# Patient Record
Sex: MALE | Race: WHITE | Employment: OTHER | URBAN - METROPOLITAN AREA
[De-identification: names, ages, dates, MRNs, and addresses within clinical notes are randomized per-mention and may not be internally consistent; named-entity substitution may affect disease eponyms.]

---

## 2017-08-22 ENCOUNTER — PRE VISIT (OUTPATIENT)
Dept: UROLOGY | Facility: CLINIC | Age: 77
End: 2017-08-22

## 2017-08-22 NOTE — TELEPHONE ENCOUNTER
"PREVISIT INFORMATION                                                    Андрей Lawson scheduled for future visit at [facility] specialty clinics.    Patient is scheduled to see Dr. Warner on 8/23/17 at 1:30pm  Reason for visit: hydrocele, 2nd opinion  Referring provider: self-referred  Has patient seen previous specialist? Yes.  Name of provider Dr. Silvio Carrera, Clinic/Facility [facility].   Medical Records:  patient stated, \"I filled out a form so you should be able to get the records from [facility].\"    REVIEW                                                      New patient packet mailed to patient: No  Medication reconciliation complete: No      No current outpatient prescriptions on file.       Allergies: Review of patient's allergies indicates not on file.    PLAN/FOLLOW-UP NEEDED                                                      The following is needed before upcoming appointment: Records from [facility] needed. Patient stated, \"I felt that I needed another opinion, so I have no referral. I want to make sure it is covered to be seen there though.\" Encouraged patient to contact his insurance company and possibly the [facility] to determine coverage. Informed patient that he could cancel or postpone the appointment with Dr. Warner if needed. Patient verbalized understanding and will discuss with his daughter jennifer.    Diane Andino RN, BSN  -[facility]  Urology/General Surgery      "

## 2017-08-23 ENCOUNTER — OFFICE VISIT (OUTPATIENT)
Dept: UROLOGY | Facility: CLINIC | Age: 77
End: 2017-08-23
Payer: COMMERCIAL

## 2017-08-23 ENCOUNTER — TELEPHONE (OUTPATIENT)
Dept: UROLOGY | Facility: CLINIC | Age: 77
End: 2017-08-23

## 2017-08-23 VITALS
HEIGHT: 70 IN | SYSTOLIC BLOOD PRESSURE: 131 MMHG | WEIGHT: 220 LBS | DIASTOLIC BLOOD PRESSURE: 78 MMHG | BODY MASS INDEX: 31.5 KG/M2 | HEART RATE: 58 BPM

## 2017-08-23 DIAGNOSIS — N43.2 OTHER HYDROCELE: Primary | ICD-10-CM

## 2017-08-23 PROCEDURE — 99203 OFFICE O/P NEW LOW 30 MIN: CPT | Performed by: UROLOGY

## 2017-08-23 RX ORDER — CHLORHEXIDINE GLUCONATE 40 MG/ML
SOLUTION TOPICAL ONCE
Status: CANCELLED | OUTPATIENT
Start: 2017-08-23 | End: 2017-08-23

## 2017-08-23 RX ORDER — CARBIDOPA AND LEVODOPA 25; 100 MG/1; MG/1
2 TABLET ORAL
Status: ON HOLD | COMMUNITY
End: 2020-11-23

## 2017-08-23 RX ORDER — CARVEDILOL 25 MG/1
25 TABLET ORAL
Status: ON HOLD | COMMUNITY
End: 2020-11-23

## 2017-08-23 ASSESSMENT — PAIN SCALES - GENERAL: PAINLEVEL: NO PAIN (0)

## 2017-08-23 NOTE — NURSING NOTE
Андрей Lawson's goals for this visit include:   Chief Complaint   Patient presents with     Consult     second opinion - hydrocele      He requests these members of his care team be copied on today's visit information: None    Initial /78 (BP Location: Left arm, Patient Position: Chair, Cuff Size: Adult Large)  Pulse 58  Wt 99.8 kg (220 lb) There is no height or weight on file to calculate BMI.  BP completed using cuff size: large

## 2017-08-23 NOTE — PATIENT INSTRUCTIONS
Surgery Instructions    Always follow your surgeon s instructions. If you don t, your surgery could be cancelled. Please use the following checklist.  Your surgery is on: The surgery scheduler will contact you within 1 week of your consult with the surgeon. If you do not hear from them, please call the clinic or RN Care Coordinator for your provider.    Time: Prearrival times can vary depending on location/type of surgery.    [place] - 1 hour pre-arrival    Note:  These times may change. A nurse will call you before surgery to confirm. If you have not received a call or if you have more questions, please call us on the working day before your surgery:  ? [place]: [phone] or [phone] (9am to 5:30pm)  Prior to surgery  ? Have a pre-op physical exam with your Primary Doctor within 30 days of surgery  - Ask your doctor to send all of your results to the surgery center/hospital before surgery. Your doctor also may ask you to bring the results with you on the day of surgery.  - Tell your doctor if:  - You are allergic to latex or rubber (latex and rubber gloves are often used in medical care).  - You are taking any medicines (including aspirin), vitamins, or herbal products. You may need to stop taking some medicines before surgery.  - You have any medical problems (allergies, diabetes, or heart disease, for example).  - You have a pacemaker or an AICD (automatic implanted cardiac defibrillator). If you do, please bring the ID card with you on the day of surgery.  - You are a smoker. People who smoke have a higher risk of infection after surgery. Ask your doctor how you can quit smoking.  - If you Primary Doctor is not within the [facility] system, you will need to have your pre-op physical faxed to us to be scanned into your chart.  - [place]: [phone] or [phone]  ? Call your insurance company. Ask if you need pre-approval for your surgery. If you do not have insurance, please let us know. If  you wish to speak to the , please alert the clinic staff so this can be arranged.  ? Arrange for someone to drive you home after surgery.  will need to be a responsible adult (18 years or older) that will provide transportation to and from surgery and stay in the waiting room during your surgery. You may not drive yourself or take public transportation to and from surgery.  ? Arrange for someone to stay with you for 24 hours after you go home. This person must be a responsible adult (18 years or older).  ? Call your surgeon or their nurse if there is any change in your health (cold, flu, infections, hospitalizations).  ? Do not smoke, drink alcohol, or take over-the-counter medicine for 24 hours before and after surgery.  ? If you take prescribed drugs, you may need to stop them until after the surgery.  Discuss what medications to take or not take prior to surgery with your Primary Doctor at your pre-op physical. Avoid over-the-counter blood-thinning medications such as Aspirin, Ibuprofen, vitamin E, or fish oil 7 days prior to surgery (unless otherwise directed by your Primary Doctor). Tylenol is a good alternative for mild pain relief prior to surgery.  ? Eating and drinking guidelines prior to surgery:  - Stop all solid food consumption 8 hours prior to surgery  - You may drink clear liquids up to 2 hours prior to surgery (water, fruit juices without pulp, jello, tea/coffee without creamer, sports drinks, clear-fat free broth (bouillon or consomme), popsicles (without milk, bits of fruit, or seeds/nuts)  ? Follow instructions given for showering or bathing before surgery.    - Use 8 ounces of antiseptic surgical soap, like:  - Hibiclens, Scrub Care, or Exidine  - You can find it at your local pharmacy, clinic, or retail store. If you have trouble, ask your pharmacist to help you find the right substitute.  - Please wash with one of the above soaps twice before coming to the hospital for  your surgery. This will decrease bacteria (germs) on your skin. It will also help reduce your chance of infection after surgery.  - Items you will need for showering:  - 4 newly washed washcloths  - 2 newly washed towels  - 8 ounces of one of the above soaps  - Following these instructions:  - The evening before surgery: Shower or bathe as you normally would, using your regular soap and a clean washcloth. Give special attention to places where your incision (surgical cut) or catheters will be. This includes your groin area. Rinse well. You may wash your hair with your regular shampoo. Next, wash your body with 4 ounces of the antiseptic soap. Use a clean, damp washcloth and gently clean your body (from the chin down). If your surgery involves your head, use the special soap on your head and scalp. Rinse well and dry off using a newly washed towel.  - The morning of surgery: Repeat the same process as the evening shower.  - Other suggestions:    Do not shave within 12 inches of your incision (surgical cut) area for at least 3 days before surgery. Shaving can make small cuts in the skin. This puts you at higher risk of infection.    Wear freshly washed pajamas or clothing after your evening shower.    Wear freshly washed clothes the day of surgery.    Wash and change your bed sheets the day before surgery to have clean bed sheets after your shower and when you get home from surgery.    If you have trouble washing all areas, make sure someone helps you.    Don't use any deodorant, lotion or powder after your shower.    Women who are menstruating should wear a fresh sanitary pad to the hospital.  ? Do not wear or add deodorant, cologne, lotion, makeup, nail polish or jewelry to surgery. If you wear fake nails, please remove at least one nail before coming to surgery (an oxygen monitor needs to be placed on your finger during surgery).  ? Bring these items to the surgery center/hospital:  - Insurance card  - Money for  parking and co-pays, if needed  - A list of all the medicines you take. Include vitamins, minerals, herbs, and over-the-counter drugs.  Note any drug allergies.  - A copy of your advance health care directive, if you have one. This tells us what treatment you would want--and who would make health care decisions--if you could no longer speak for yourself. You may request this form in advance or download it from [email or web address].  - A case for glasses, contact lenses, hearing aids, or dentures.  - Your inhaler or CPAP machine, if you use these at home.  ? Leave extra cash, jewelry, and other valuables at home.  When you arrive  When you get to the surgery center/hospital, you will:  ? Check in. If you are under age 18, you must be with a parent or legal guardian.  ? Sign consent forms, if you haven t already. These forms state that you know the risks and benefits of surgery. When you sign the forms, you give us permission to do the surgery. Do not sign them unless you understand what will happen during and after your surgery. If you have any questions about your surgery, ask to speak with your doctor before you sign the forms. If you don t understand the answers, ask again.  ? Receive a copy of the Patient s Bill of Rights. If you do not receive a copy, please ask for one.  ? Change into hospital clothes. Your belongings will be placed in a bag. We will return them to you after surgery.  ? Meet with the anesthesia provider. He or she will tell you what kind of anesthesia (medicine) will be used to keep you comfortable during surgery.  Remember: it s okay to remind doctors and nurses to wash their hands before touching you.  In most cases, your surgeon will use a marker to write his or her initials on the surgery site. This ensures that the exact site is operated on.  For safety reasons, we will ask you the same questions many times. For example, we may ask your name and birth date over and over again.  Friends  and family can stay with you until it s time for surgery. While you re in surgery, they will be in the waiting area. Please note that cell phones are not allowed in some patient care areas.  If you have questions about what will happen in the operating room, talk to your care team.  After surgery  We will move you to a recovery room, where we will watch you closely. If you have any pain or discomfort, tell your nurse. He or she will try to make you comfortable.  If you are staying overnight, we will move you to your hospital room after you are awake.  If you are going home, we will move you to another room. Friends and family may be able to join you. The length of time you spend in recovery depend on the type of medicine you received, your medical condition, the type of surgery you had, or your response to the anesthesia given during your procedure.  When you are discharged from the recovery room, the nurses will review instructions with you and your caregiver.  ? Please wash your hands every time you touch the wound or change bandages or dressings.  ? Do not submerge the wound in water.  You may not use a bathtub or hot tub until the wound is closed. The wait time frame is generally 2-3 weeks, but any open area can be a source of incoming bacteria, so it is better to be on the safe side and avoid water submersion until your wound is fully healed.  ? You may take a shower 24 hours after surgery. Double check with your surgeon if it is OK for water to run over the wound, whether it has been sutured, stapled, glued, or is open. You may gently wash the wound using the antiseptic soap provided for your pre-surgery showering (do not use a washcloth). Any mild soap will work as well.  ? Many surgical wounds will have small white strips of tape on them called steri-strips.  Do not remove these. The edges will curl and fall off within 7-10 days with normal showering.  ? If you are going home with sutures (stitches) or  staples, you must return to the clinic to have them taken out, usually within 1-2 weeks. Some stitches are dissolvable and do not require removal. Make sure to clarify with your surgeon or surgery nurse reviewing discharge paperwork what kind of sutures you have.  ? Signs and symptoms of infection include:  - Fever, temperature over 101.5   F  - Redness  - Swelling  - Increased pain  - Green or yellow drainage which may or may not have a foul odor  Dealing with pain  A nurse will check your comfort level often during your stay. He or she will work with you to manage your pain.  Remember:  ? All pain is real. There are many ways to control pain. We can help you decide what works best for you.  ? Ask for pain medicine when you need it. Don t try to  tough it out --this can make you feel worse. Always take your medicine as ordered.  ? Medicine doesn t work the same for everyone. If your medicine isn t working, tell your nurse. There may be other medicines or treatments we can try.  Going home  We will let you know when you re ready to leave the surgery center or hospital. Before you leave, we will tell you how to care for yourself at home and prevent infections. If you do not understand something, please say so. We will answer any questions you have. We will then help you get ready to leave.  Remember, you must have a responsible adult (18 years or older) to stay with you 24 hours after you leave the hospital.  Take it easy when you get home. You will need some time to recover--you may be more tired than you realize at first. Rest and relax for at least the first 24 hours at home. You ll feel better and heal faster if you take good care of yourself.  Follow the discharge instructions that are given to you when you leave the surgery center or hospital  Please call the clinic if you experience any problems during regular clinic hours (Monday-Friday 8:00am-5:00pm).  If you experience problems during non-clinic hours,  please call the [facility] on-call line at [phone] and ask the  to page the on-call Provider for your specialty. The on-call Provider will call you back and can triage your symptoms and further advise. If you are having an emergency, always call 911 or seek immediate evaluation at the Emergency Room.  Locations  [facility]  Same-day surgery center - 2nd floor, check-in #5  [address] [place] [address]  [phone]  [email or web address]

## 2017-08-23 NOTE — MR AVS SNAPSHOT
After Visit Summary   8/23/2017    Андрей Lawson    MRN: [id number]           Patient Information     Date Of Birth          1940        Visit Information        Provider Department      8/23/2017 1:30 PM Bola Warner MD [facility]        Today's Diagnoses     Other hydrocele    -  1      Care Instructions    Surgery Instructions    Always follow your surgeon s instructions. If you don t, your surgery could be cancelled. Please use the following checklist.  Your surgery is on: The surgery scheduler will contact you within 1 week of your consult with the surgeon. If you do not hear from them, please call the clinic or RN Care Coordinator for your provider.    Time: Prearrival times can vary depending on location/type of surgery.    [place] - 1 hour pre-arrival    Note:  These times may change. A nurse will call you before surgery to confirm. If you have not received a call or if you have more questions, please call us on the working day before your surgery:  ? [place]: [phone] or [phone] (9am to 5:30pm)  Prior to surgery  ? Have a pre-op physical exam with your Primary Doctor within 30 days of surgery  - Ask your doctor to send all of your results to the surgery center/hospital before surgery. Your doctor also may ask you to bring the results with you on the day of surgery.  - Tell your doctor if:  - You are allergic to latex or rubber (latex and rubber gloves are often used in medical care).  - You are taking any medicines (including aspirin), vitamins, or herbal products. You may need to stop taking some medicines before surgery.  - You have any medical problems (allergies, diabetes, or heart disease, for example).  - You have a pacemaker or an AICD (automatic implanted cardiac defibrillator). If you do, please bring the ID card with you on the day of surgery.  - You are a smoker. People who smoke have a higher risk of infection after surgery. Ask  your doctor how you can quit smoking.  - If you Primary Doctor is not within the [facility] system, you will need to have your pre-op physical faxed to us to be scanned into your chart.  - [place]: [phone] or [phone]  ? Call your insurance company. Ask if you need pre-approval for your surgery. If you do not have insurance, please let us know. If you wish to speak to the , please alert the clinic staff so this can be arranged.  ? Arrange for someone to drive you home after surgery.  will need to be a responsible adult (18 years or older) that will provide transportation to and from surgery and stay in the waiting room during your surgery. You may not drive yourself or take public transportation to and from surgery.  ? Arrange for someone to stay with you for 24 hours after you go home. This person must be a responsible adult (18 years or older).  ? Call your surgeon or their nurse if there is any change in your health (cold, flu, infections, hospitalizations).  ? Do not smoke, drink alcohol, or take over-the-counter medicine for 24 hours before and after surgery.  ? If you take prescribed drugs, you may need to stop them until after the surgery.  Discuss what medications to take or not take prior to surgery with your Primary Doctor at your pre-op physical. Avoid over-the-counter blood-thinning medications such as Aspirin, Ibuprofen, vitamin E, or fish oil 7 days prior to surgery (unless otherwise directed by your Primary Doctor). Tylenol is a good alternative for mild pain relief prior to surgery.  ? Eating and drinking guidelines prior to surgery:  - Stop all solid food consumption 8 hours prior to surgery  - You may drink clear liquids up to 2 hours prior to surgery (water, fruit juices without pulp, jello, tea/coffee without creamer, sports drinks, clear-fat free broth (bouillon or consomme), popsicles (without milk, bits of fruit, or seeds/nuts)  ? Follow instructions given for  showering or bathing before surgery.    - Use 8 ounces of antiseptic surgical soap, like:  - Hibiclens, Scrub Care, or Exidine  - You can find it at your local pharmacy, clinic, or retail store. If you have trouble, ask your pharmacist to help you find the right substitute.  - Please wash with one of the above soaps twice before coming to the hospital for your surgery. This will decrease bacteria (germs) on your skin. It will also help reduce your chance of infection after surgery.  - Items you will need for showering:  - 4 newly washed washcloths  - 2 newly washed towels  - 8 ounces of one of the above soaps  - Following these instructions:  - The evening before surgery: Shower or bathe as you normally would, using your regular soap and a clean washcloth. Give special attention to places where your incision (surgical cut) or catheters will be. This includes your groin area. Rinse well. You may wash your hair with your regular shampoo. Next, wash your body with 4 ounces of the antiseptic soap. Use a clean, damp washcloth and gently clean your body (from the chin down). If your surgery involves your head, use the special soap on your head and scalp. Rinse well and dry off using a newly washed towel.  - The morning of surgery: Repeat the same process as the evening shower.  - Other suggestions:    Do not shave within 12 inches of your incision (surgical cut) area for at least 3 days before surgery. Shaving can make small cuts in the skin. This puts you at higher risk of infection.    Wear freshly washed pajamas or clothing after your evening shower.    Wear freshly washed clothes the day of surgery.    Wash and change your bed sheets the day before surgery to have clean bed sheets after your shower and when you get home from surgery.    If you have trouble washing all areas, make sure someone helps you.    Don't use any deodorant, lotion or powder after your shower.    Women who are menstruating should wear a fresh  sanitary pad to the hospital.  ? Do not wear or add deodorant, cologne, lotion, makeup, nail polish or jewelry to surgery. If you wear fake nails, please remove at least one nail before coming to surgery (an oxygen monitor needs to be placed on your finger during surgery).  ? Bring these items to the surgery center/hospital:  - Insurance card  - Money for parking and co-pays, if needed  - A list of all the medicines you take. Include vitamins, minerals, herbs, and over-the-counter drugs.  Note any drug allergies.  - A copy of your advance health care directive, if you have one. This tells us what treatment you would want--and who would make health care decisions--if you could no longer speak for yourself. You may request this form in advance or download it from [email or web address].  - A case for glasses, contact lenses, hearing aids, or dentures.  - Your inhaler or CPAP machine, if you use these at home.  ? Leave extra cash, jewelry, and other valuables at home.  When you arrive  When you get to the surgery center/hospital, you will:  ? Check in. If you are under age 18, you must be with a parent or legal guardian.  ? Sign consent forms, if you haven t already. These forms state that you know the risks and benefits of surgery. When you sign the forms, you give us permission to do the surgery. Do not sign them unless you understand what will happen during and after your surgery. If you have any questions about your surgery, ask to speak with your doctor before you sign the forms. If you don t understand the answers, ask again.  ? Receive a copy of the Patient s Bill of Rights. If you do not receive a copy, please ask for one.  ? Change into hospital clothes. Your belongings will be placed in a bag. We will return them to you after surgery.  ? Meet with the anesthesia provider. He or she will tell you what kind of anesthesia (medicine) will be used to keep you comfortable during surgery.  Remember: it s okay to  remind doctors and nurses to wash their hands before touching you.  In most cases, your surgeon will use a marker to write his or her initials on the surgery site. This ensures that the exact site is operated on.  For safety reasons, we will ask you the same questions many times. For example, we may ask your name and birth date over and over again.  Friends and family can stay with you until it s time for surgery. While you re in surgery, they will be in the waiting area. Please note that cell phones are not allowed in some patient care areas.  If you have questions about what will happen in the operating room, talk to your care team.  After surgery  We will move you to a recovery room, where we will watch you closely. If you have any pain or discomfort, tell your nurse. He or she will try to make you comfortable.  If you are staying overnight, we will move you to your hospital room after you are awake.  If you are going home, we will move you to another room. Friends and family may be able to join you. The length of time you spend in recovery depend on the type of medicine you received, your medical condition, the type of surgery you had, or your response to the anesthesia given during your procedure.  When you are discharged from the recovery room, the nurses will review instructions with you and your caregiver.  ? Please wash your hands every time you touch the wound or change bandages or dressings.  ? Do not submerge the wound in water.  You may not use a bathtub or hot tub until the wound is closed. The wait time frame is generally 2-3 weeks, but any open area can be a source of incoming bacteria, so it is better to be on the safe side and avoid water submersion until your wound is fully healed.  ? You may take a shower 24 hours after surgery. Double check with your surgeon if it is OK for water to run over the wound, whether it has been sutured, stapled, glued, or is open. You may gently wash the wound using  the antiseptic soap provided for your pre-surgery showering (do not use a washcloth). Any mild soap will work as well.  ? Many surgical wounds will have small white strips of tape on them called steri-strips.  Do not remove these. The edges will curl and fall off within 7-10 days with normal showering.  ? If you are going home with sutures (stitches) or staples, you must return to the clinic to have them taken out, usually within 1-2 weeks. Some stitches are dissolvable and do not require removal. Make sure to clarify with your surgeon or surgery nurse reviewing discharge paperwork what kind of sutures you have.  ? Signs and symptoms of infection include:  - Fever, temperature over 101.5   F  - Redness  - Swelling  - Increased pain  - Green or yellow drainage which may or may not have a foul odor  Dealing with pain  A nurse will check your comfort level often during your stay. He or she will work with you to manage your pain.  Remember:  ? All pain is real. There are many ways to control pain. We can help you decide what works best for you.  ? Ask for pain medicine when you need it. Don t try to  tough it out --this can make you feel worse. Always take your medicine as ordered.  ? Medicine doesn t work the same for everyone. If your medicine isn t working, tell your nurse. There may be other medicines or treatments we can try.  Going home  We will let you know when you re ready to leave the surgery center or hospital. Before you leave, we will tell you how to care for yourself at home and prevent infections. If you do not understand something, please say so. We will answer any questions you have. We will then help you get ready to leave.  Remember, you must have a responsible adult (18 years or older) to stay with you 24 hours after you leave the hospital.  Take it easy when you get home. You will need some time to recover--you may be more tired than you realize at first. Rest and relax for at least the first 24  hours at home. You ll feel better and heal faster if you take good care of yourself.  Follow the discharge instructions that are given to you when you leave the surgery center or hospital  Please call the clinic if you experience any problems during regular clinic hours (Monday-Friday 8:00am-5:00pm).  If you experience problems during non-clinic hours, please call the [facility] on-call line at [phone] and ask the  to page the on-call Provider for your specialty. The on-call Provider will call you back and can triage your symptoms and further advise. If you are having an emergency, always call 911 or seek immediate evaluation at the Emergency Room.  Locations  [facility]  Same-day surgery center - 2nd floor, check-in #8 [address] [place] [address]  [phone]  [email or web address]                Follow-ups after your visit        Who to contact     If you have questions or need follow up information about today's clinic visit or your schedule please contact [facility] directly at [phone].  Normal or non-critical lab and imaging results will be communicated to you by MyChart, letter or phone within 4 business days after the clinic has received the results. If you do not hear from us within 7 days, please contact the clinic through [employer] or phone. If you have a critical or abnormal lab result, we will notify you by phone as soon as possible.  Submit refill requests through [employer] or call your pharmacy and they will forward the refill request to us. Please allow 3 business days for your refill to be completed.          Additional Information About Your Visit        [employer] Information     [employer] is an electronic gateway that provides easy, online access to your medical records. With [employer], you can request a clinic appointment, read your test results, renew a prescription or communicate with your care team.     To  sign up for [employer] visit the website at [email or web address]   You will be asked to enter the access code listed below, as well as some personal information. Please follow the directions to create your username and password.     Your access code is: [email or web address]  Expires: 2017  2:44 PM     Your access code will  in 90 days. If you need help or a new code, please contact your [facility] or call [phone] for assistance.        Care EveryWhere ID     This is your Care EveryWhere ID. This could be used by other organizations to access your [place] medical records  [id number]        Your Vitals Were     Pulse                   58            Blood Pressure from Last 3 Encounters:   17 131/78    Weight from Last 3 Encounters:   17 99.8 kg (220 lb)              We Performed the Following     [place]-Operative Worksheet  (Urology General)        Primary Care Provider    None       No address on file        Equal Access to Services     SOHAIL [facility] Hospital[facility]INDSEY : Hadii aad ku hadasho Soomaali, waaxda luqadaha, qaybta kaalmada adeegyada, maryan kaurin boubacar patel . So [facility] [phone].    ATENCIÓN: Si habla español, tiene a almaguer disposición servicios gratuitos de asistencia lingüística. Llame al [phone].    We comply with applicable federal civil rights laws and [place] laws. We do not discriminate on the basis of race, color, national origin, age, disability sex, sexual orientation or gender identity.            Thank you!     Thank you for choosing [facility]  for your care. Our goal is always to provide you with excellent care. Hearing back from our patients is one way we can continue to improve our services. Please take a few minutes to complete the written survey that you may receive in the mail after your visit with us. Thank you!             Your Updated Medication List - Protect others around you: Learn how to safely use, store  and throw away your medicines at www.disposemymeds.org.          This list is accurate as of: 8/23/17  2:45 PM.  Always use your most recent med list.                   Brand Name Dispense Instructions for use Diagnosis    carbidopa-levodopa  MG per tablet    SINEMET     Take 2 tablets by mouth        carvedilol 25 MG tablet    COREG     Take 25 mg by mouth        hypromellose 0.3 % Soln ophthalmic solution    GENTEAL     Apply 1 drop to eye        PANTOPRAZOLE SODIUM PO      Take 20 mg by mouth 2 times daily (before meals)        sertraline 50 MG tablet    ZOLOFT     25 mg        TRAZODONE HCL PO

## 2017-08-23 NOTE — TELEPHONE ENCOUNTER
Procedure: left hydrocele repair with drain placement  Facility: [facility]  Length: 75minute(s)  Surgeon: Bola Warner MD  Anesthesia: General  BMI: 31 (If over 43 for general or 45 for MAC cannot be scheduled at [facility])   Pre-op Appointments needed: yes, with PCP  Post-op appointments needed:2 weeks   provider only   needed:  No  Surgery packet/instructions given to patient?  Yes   Special Considerations: Per Dr. Warner, surgery to be completed ASAP.    Diane Andino RN, BSN  [facility]  Urology/General Surgery

## 2017-08-23 NOTE — PROGRESS NOTES
I am seeing Андрей Lawson in consultation  for evaluation of hydrocele .    HPI:  Андрей Lawson is a 76 year old male with left hydrocele.  This affects his urination as the penis retracts when he's sitting.    REVIEW OF SYSTEMS:  General: negative  Skin: negative  Eyes: negative  Ears/Nose/Throat: negative  Respiratory: negative  Cardiovascular: negative  Gastrointestinal: negative  Genitourinary: see HPI  Musculoskeletal: negative  Neurologic: negative  Psychiatric: negative  Hematologic/Lymphatic/Immunologic: negative  Endocrine: negative    PAST MEDICAL HX:  Parkinson's disease.  Hydrocele   Spinal stenosis.  Rotator cuff pain  Osteoarthritis knee.  Depression    PAST SURG HX:  Bilateral KELSIE  Fistulectomy    SOCIAL HX:  Social History   Substance Use Topics     Smoking status: Never Smoker     Smokeless tobacco: Never Used     Alcohol use Not on file       MEDICATIONS:  Current Outpatient Prescriptions   Medication Sig     hypromellose (GENTEAL) 0.3 % SOLN ophthalmic solution Apply 1 drop to eye     carbidopa-levodopa (SINEMET)  MG per tablet Take 2 tablets by mouth     carvedilol (COREG) 25 MG tablet Take 25 mg by mouth     sertraline (ZOLOFT) 50 MG tablet 25 mg     TRAZODONE HCL PO      PANTOPRAZOLE SODIUM PO Take 20 mg by mouth 2 times daily (before meals)     No current facility-administered medications for this visit.        ALLERGIES:  Review of patient's allergies indicates no known allergies.      GENERAL PHYSICAL EXAM:     /78 (BP Location: Left arm, Patient Position: Chair, Cuff Size: Adult Large)  Pulse 58  Wt 99.8 kg (220 lb)   Constitutional: No acute distress. Well nourished.   PSYCH: normal mood and affect.  NEURO: normal gait, no focal deficits.   EYES: anicteric, EOMI, PERR.  CARDIOPULMONARY: breathing non-labored, pulse regular rate/rhythm, no peripheral edema.  GI: Abdomen soft, non-tender, no surgical scars, no organomegaly.  MUSCULOSKELETAL: normal limb proportions, no  muscle wasting, no contractures.  SKIN: Normal virilized hair distribution, no lesions, warts or rashes over genitalia, abdomen extremities or face.  HEME/LYMPH: no ecchymosis, no lymphadenopathy in groin or neck, no lymphedema.     EXAM:  Phallus normal , meatus adequate, no plaques palpated.   Left testis not palpable due to ijeoma-sized hydrocele.    Prostate exam: deferred     Imaging/labs:  Scrotal ultrasound was done, this showed simple hydrocele on left.    ASSESSMENT:     Hydrocele, left.    Right testis surgically absent.    PLAN:  Orders Placed This Encounter     hypromellose (GENTEAL) 0.3 % SOLN ophthalmic solution     carbidopa-levodopa (SINEMET)  MG per tablet     carvedilol (COREG) 25 MG tablet     sertraline (ZOLOFT) 50 MG tablet     TRAZODONE HCL PO     PANTOPRAZOLE SODIUM PO         Discussed risks, benefits, and alternatives of hydrocele repair.    He would like this done to help voiding.    Would place short-term drain to help prevent seroma after.    Copied cc to Consulting provider Established Patient        Thank-you for the kind consultation.  Bola Warner MD     Urological Surgeon

## 2017-08-28 NOTE — TELEPHONE ENCOUNTER
Called and left a message on the line for Emma to return my call in regards to scheduling her father's surgery. Instructed her to return call to clinic ASAP. Clinic number provided. Please advise. Nupur Simpson-Surgery Scheduler

## 2017-08-28 NOTE — TELEPHONE ENCOUNTER
[facility] Call Center    Phone Message    Name of Caller: Emma    Phone Number:   [phone]    Best time to return call: Any    May a detailed message be left on voicemail: yes    Relation to patient: Other Name: Daughter  Relationship:   Is there legal documentation in chart to discuss information with this person: Yes. Legal Documentation is verified by .      Reason for Call: Other: Patients daughter is trying to schedule surgery with Dr. Warner. The surgery scheduler Shelby is out until 09/05/17 and is instructed to call the clinic to get this surgery scheduled. Please advise     Action Taken: Message routed to:  Adult Clinics: Urology p [phone]

## 2017-08-29 NOTE — TELEPHONE ENCOUNTER
Called and spoke to daughter Emma of Андрей Lawson and explained in detail about surgery details. Patient is scheduled for September 13, 2017 arriving at 10:45am for an 11:45am surgery start time. Explained that the procedure in total would take around 75 minutes and be expecting a call for pre-op teaching to discuss health history prior to. Patient is also going to pre-operative appointment 1 week prior and should be good to go after that pending results.  Also notified to have a  home for the day of surgery. Patient's daughter is satisfied at this time and will call with any questions. Please advise. Nupur Simpson-Surgery Scheduler

## 2017-08-29 NOTE — TELEPHONE ENCOUNTER
Writer spoke with Nupur, surgery scheduler who verified that surgery is scheduled and nothing further is needed at this time. Will close encounter at this time.    Diane Andino RN, BSN  [facility]  Urology/General Surgery

## 2017-09-07 ENCOUNTER — TRANSFERRED RECORDS (OUTPATIENT)
Dept: HEALTH INFORMATION MANAGEMENT | Facility: CLINIC | Age: 77
End: 2017-09-07

## 2017-09-12 ENCOUNTER — ANESTHESIA EVENT (OUTPATIENT)
Dept: SURGERY | Facility: AMBULATORY SURGERY CENTER | Age: 77
End: 2017-09-12

## 2017-09-12 NOTE — ANESTHESIA PREPROCEDURE EVALUATION
Physical Exam  Normal systems: cardiovascular and dental    Airway   Mallampati: II  TM distance: >3 FB  Neck ROM: full    Dental     Cardiovascular   Rhythm and rate: regular and normal      Pulmonary    breath sounds clear to auscultation                    Anesthesia Plan      History & Physical Review  History and physical reviewed and following examination; no interval change.    ASA Status:  2 .    NPO Status:  > 8 hours    Plan for General and LMA with Intravenous and Propofol induction. Maintenance will be TIVA.    PONV prophylaxis:  Ondansetron (or other 5HT-3) and Dexamethasone or Solumedrol  Maintain Sinemet dosing intervals as able.    Family reports significant weakness at baseline requiring assistance and use of a walker. Concern voiced for additional weakness post surgery. Challenging to predict but discussed likelihood that will need continued and potentially more assistance post his procedure. Family reports having adequate help at home. Will avoid any long acting anesthetics, limit narcotics and no use muscle relaxant for procedure.       Postoperative Care  Postoperative pain management:  IV analgesics, Oral pain medications and Multi-modal analgesia.      Consents  Anesthetic plan, risks, benefits and alternatives discussed with:  Patient..                ANESTHESIA PREOP EVALUATION    PROCEDURE: Procedure(s):  Left hydrocele repair with drain placement - Wound Class:     HPI: Андрей FARZANEH Lawson is a 76 year old male who presents for above procedure.    PAST MEDICAL HISTORY:    Past Medical History:   Diagnosis Date     Arthritis     spinal stenosis     Hypertension     on heart meds but unsure why     Parkinsons disease (H)        PAST SURGICAL HISTORY:    Past Surgical History:   Procedure Laterality Date     ORTHOPEDIC SURGERY Bilateral     hip replacement       PAST ANESTHESIA HISTORY:     No personal or family h/o anesthesia problems    SOCIAL HISTORY:       Social History   Substance  Use Topics     Smoking status: Never Smoker     Smokeless tobacco: Never Used     Alcohol use No      Comment: 1-3 per night       ALLERGIES:     No Known Allergies    MEDICATIONS:       (Not in a hospital admission)    Current Outpatient Prescriptions   Medication Sig Dispense Refill     carbidopa-levodopa (SINEMET)  MG per tablet Take 2 tablets by mouth       carvedilol (COREG) 25 MG tablet Take 25 mg by mouth       sertraline (ZOLOFT) 50 MG tablet 25 mg       TRAZODONE HCL PO        PANTOPRAZOLE SODIUM PO Take 20 mg by mouth 2 times daily (before meals)       hypromellose (GENTEAL) 0.3 % SOLN ophthalmic solution Apply 1 drop to eye         Current Outpatient Prescriptions Ordered in [facility]   Medication Sig Dispense Refill     carbidopa-levodopa (SINEMET)  MG per tablet Take 2 tablets by mouth       carvedilol (COREG) 25 MG tablet Take 25 mg by mouth       sertraline (ZOLOFT) 50 MG tablet 25 mg       TRAZODONE HCL PO        PANTOPRAZOLE SODIUM PO Take 20 mg by mouth 2 times daily (before meals)       hypromellose (GENTEAL) 0.3 % SOLN ophthalmic solution Apply 1 drop to eye       No current [facility]-ordered facility-administered medications on file.        PHYSICAL EXAM:    Vitals: T Data Unavailable, P Data Unavailable, BP Data Unavailable, R Data Unavailable, SpO2  , Weight Wt Readings from Last 2 Encounters:   08/23/17 99.8 kg (220 lb)       See doc flowsheet      No Data Recorded    No Data Recorded    No Data Recorded    No Data Recorded    No Data Recorded    No data recorded.  No data recorded.      NPO STATUS: see doc flowsheet    LABS:    BMP:  No results for input(s): NA, POTASSIUM, CHLORIDE, CO2, BUN, CR, GLC, JENNIFER in the last 46897 hours.    LFTs:   No results for input(s): PROTTOTAL, ALBUMIN, BILITOTAL, ALKPHOS, AST, ALT, BILIDIRECT in the last 31331 hours.    CBC:   No results for input(s): WBC, RBC, HGB, HCT, MCV, MCH, MCHC, RDW, PLT in the last 17615 hours.    Coags:  No results for input(s):  INR, PTT, FIBR in the last 07565 hours.    Imaging:  No orders to display       Raj Peterson MD  Anesthesiology Staff  Pager [phone]    9/12/2017  10:21 AM              .

## 2017-09-13 ENCOUNTER — NURSE TRIAGE (OUTPATIENT)
Dept: NURSING | Facility: CLINIC | Age: 77
End: 2017-09-13

## 2017-09-13 ENCOUNTER — HOSPITAL ENCOUNTER (OUTPATIENT)
Facility: AMBULATORY SURGERY CENTER | Age: 77
Discharge: HOME | End: 2017-09-13
Attending: UROLOGY | Admitting: UROLOGY
Payer: COMMERCIAL

## 2017-09-13 ENCOUNTER — ANESTHESIA (OUTPATIENT)
Dept: SURGERY | Facility: AMBULATORY SURGERY CENTER | Age: 77
End: 2017-09-13

## 2017-09-13 VITALS
RESPIRATION RATE: 14 BRPM | TEMPERATURE: 98 F | OXYGEN SATURATION: 97 % | SYSTOLIC BLOOD PRESSURE: 155 MMHG | DIASTOLIC BLOOD PRESSURE: 87 MMHG

## 2017-09-13 DIAGNOSIS — N43.2 OTHER HYDROCELE: Primary | ICD-10-CM

## 2017-09-13 PROCEDURE — G8916 PT W IV AB GIVEN ON TIME: HCPCS

## 2017-09-13 PROCEDURE — G8907 PT DOC NO EVENTS ON DISCHARG: HCPCS

## 2017-09-13 PROCEDURE — 55040 REMOVAL OF HYDROCELE: CPT | Performed by: UROLOGY

## 2017-09-13 PROCEDURE — 55040 REMOVAL OF HYDROCELE: CPT | Mod: LT

## 2017-09-13 RX ORDER — ACETAMINOPHEN 325 MG/1
975 TABLET ORAL ONCE
Status: DISCONTINUED | OUTPATIENT
Start: 2017-09-13 | End: 2017-09-14 | Stop reason: HOSPADM

## 2017-09-13 RX ORDER — CEFAZOLIN SODIUM 2 G/100ML
2 INJECTION, SOLUTION INTRAVENOUS
Status: COMPLETED | OUTPATIENT
Start: 2017-09-13 | End: 2017-09-13

## 2017-09-13 RX ORDER — HYDROCODONE BITARTRATE AND ACETAMINOPHEN 5; 325 MG/1; MG/1
1-2 TABLET ORAL ONCE
Status: CANCELLED | OUTPATIENT
Start: 2017-09-13 | End: 2017-09-13

## 2017-09-13 RX ORDER — CEFAZOLIN SODIUM 1 G/3ML
1 INJECTION, POWDER, FOR SOLUTION INTRAMUSCULAR; INTRAVENOUS SEE ADMIN INSTRUCTIONS
Status: DISCONTINUED | OUTPATIENT
Start: 2017-09-13 | End: 2017-09-14 | Stop reason: HOSPADM

## 2017-09-13 RX ORDER — PROPOFOL 10 MG/ML
INJECTION, EMULSION INTRAVENOUS PRN
Status: DISCONTINUED | OUTPATIENT
Start: 2017-09-13 | End: 2017-09-13

## 2017-09-13 RX ORDER — MEPERIDINE HYDROCHLORIDE 25 MG/ML
12.5 INJECTION INTRAMUSCULAR; INTRAVENOUS; SUBCUTANEOUS
Status: DISCONTINUED | OUTPATIENT
Start: 2017-09-13 | End: 2017-09-14 | Stop reason: HOSPADM

## 2017-09-13 RX ORDER — ONDANSETRON 2 MG/ML
4 INJECTION INTRAMUSCULAR; INTRAVENOUS EVERY 30 MIN PRN
Status: DISCONTINUED | OUTPATIENT
Start: 2017-09-13 | End: 2017-09-14 | Stop reason: HOSPADM

## 2017-09-13 RX ORDER — NALOXONE HYDROCHLORIDE 0.4 MG/ML
.1-.4 INJECTION, SOLUTION INTRAMUSCULAR; INTRAVENOUS; SUBCUTANEOUS
Status: DISCONTINUED | OUTPATIENT
Start: 2017-09-13 | End: 2017-09-14 | Stop reason: HOSPADM

## 2017-09-13 RX ORDER — SODIUM CHLORIDE, SODIUM LACTATE, POTASSIUM CHLORIDE, CALCIUM CHLORIDE 600; 310; 30; 20 MG/100ML; MG/100ML; MG/100ML; MG/100ML
INJECTION, SOLUTION INTRAVENOUS CONTINUOUS
Status: DISCONTINUED | OUTPATIENT
Start: 2017-09-13 | End: 2017-09-14 | Stop reason: HOSPADM

## 2017-09-13 RX ORDER — FENTANYL CITRATE 50 UG/ML
25-50 INJECTION, SOLUTION INTRAMUSCULAR; INTRAVENOUS
Status: DISCONTINUED | OUTPATIENT
Start: 2017-09-13 | End: 2017-09-14 | Stop reason: HOSPADM

## 2017-09-13 RX ORDER — CHLORHEXIDINE GLUCONATE 40 MG/ML
SOLUTION TOPICAL ONCE
Status: DISCONTINUED | OUTPATIENT
Start: 2017-09-13 | End: 2017-09-14 | Stop reason: HOSPADM

## 2017-09-13 RX ORDER — LIDOCAINE 40 MG/G
CREAM TOPICAL
Status: DISCONTINUED | OUTPATIENT
Start: 2017-09-13 | End: 2017-09-14 | Stop reason: HOSPADM

## 2017-09-13 RX ORDER — BACITRACIN ZINC 500 [USP'U]/G
OINTMENT TOPICAL PRN
Status: DISCONTINUED | OUTPATIENT
Start: 2017-09-13 | End: 2017-09-13 | Stop reason: HOSPADM

## 2017-09-13 RX ORDER — ONDANSETRON 4 MG/1
4 TABLET, ORALLY DISINTEGRATING ORAL EVERY 30 MIN PRN
Status: DISCONTINUED | OUTPATIENT
Start: 2017-09-13 | End: 2017-09-14 | Stop reason: HOSPADM

## 2017-09-13 RX ORDER — FENTANYL CITRATE 50 UG/ML
INJECTION, SOLUTION INTRAMUSCULAR; INTRAVENOUS PRN
Status: DISCONTINUED | OUTPATIENT
Start: 2017-09-13 | End: 2017-09-13

## 2017-09-13 RX ORDER — HYDROCODONE BITARTRATE AND ACETAMINOPHEN 5; 325 MG/1; MG/1
1-2 TABLET ORAL EVERY 4 HOURS PRN
Qty: 24 TABLET | Refills: 0 | Status: SHIPPED | OUTPATIENT
Start: 2017-09-13

## 2017-09-13 RX ORDER — HYDROMORPHONE HYDROCHLORIDE 1 MG/ML
.3-.5 INJECTION, SOLUTION INTRAMUSCULAR; INTRAVENOUS; SUBCUTANEOUS EVERY 10 MIN PRN
Status: DISCONTINUED | OUTPATIENT
Start: 2017-09-13 | End: 2017-09-14 | Stop reason: HOSPADM

## 2017-09-13 RX ORDER — LIDOCAINE HYDROCHLORIDE 20 MG/ML
INJECTION, SOLUTION INFILTRATION; PERINEURAL PRN
Status: DISCONTINUED | OUTPATIENT
Start: 2017-09-13 | End: 2017-09-13

## 2017-09-13 RX ORDER — BUPIVACAINE HYDROCHLORIDE AND EPINEPHRINE 2.5; 5 MG/ML; UG/ML
INJECTION, SOLUTION INFILTRATION; PERINEURAL PRN
Status: DISCONTINUED | OUTPATIENT
Start: 2017-09-13 | End: 2017-09-13 | Stop reason: HOSPADM

## 2017-09-13 RX ADMIN — Medication 100 MCG: at 12:46

## 2017-09-13 RX ADMIN — PROPOFOL 50 MG: 10 INJECTION, EMULSION INTRAVENOUS at 12:33

## 2017-09-13 RX ADMIN — Medication 50 MCG: at 12:49

## 2017-09-13 RX ADMIN — PROPOFOL 150 MG: 10 INJECTION, EMULSION INTRAVENOUS at 12:30

## 2017-09-13 RX ADMIN — CEFAZOLIN SODIUM 2 G: 2 INJECTION, SOLUTION INTRAVENOUS at 12:39

## 2017-09-13 RX ADMIN — SODIUM CHLORIDE, SODIUM LACTATE, POTASSIUM CHLORIDE, CALCIUM CHLORIDE: 600; 310; 30; 20 INJECTION, SOLUTION INTRAVENOUS at 11:30

## 2017-09-13 RX ADMIN — SODIUM CHLORIDE, SODIUM LACTATE, POTASSIUM CHLORIDE, CALCIUM CHLORIDE: 600; 310; 30; 20 INJECTION, SOLUTION INTRAVENOUS at 12:28

## 2017-09-13 RX ADMIN — LIDOCAINE HYDROCHLORIDE 60 MG: 20 INJECTION, SOLUTION INFILTRATION; PERINEURAL at 12:30

## 2017-09-13 RX ADMIN — FENTANYL CITRATE 25 MCG: 50 INJECTION, SOLUTION INTRAMUSCULAR; INTRAVENOUS at 12:30

## 2017-09-13 RX ADMIN — Medication 100 MCG: at 12:55

## 2017-09-13 NOTE — ANESTHESIA POSTPROCEDURE EVALUATION
Patient: Андрей Lawson    Procedure(s):  Left hydrocele repair with drain placement - Wound Class: I-Clean    Diagnosis:left hydrocele  Diagnosis Additional Information: No value filed.    Anesthesia Type:  General, LMA    Note:  Anesthesia Post Evaluation    Patient location during evaluation: PACU  Patient participation: Able to fully participate in evaluation  Level of consciousness: awake and alert  Pain management: adequate  Airway patency: patent  Cardiovascular status: acceptable  Respiratory status: acceptable  Hydration status: acceptable  PONV: none     Anesthetic complications: None    Comments: Patient able to progress to Phase II very shortly after arrival to PACU.  Able to state his concerns, ask and answers questions appropriately.  No evidence of confusion, delerium. Minimal short acting narcotic used at beginning of case.  Denies any significant pain. No nausea or vomiting. Able to tolerate clear liquids and snacks.  Reports his baseline chronic back pain and lower extremity weakness.     Daughter in PACU and patient received his scheduled oral dose Sinemet on time at 1400. Improvement in tremor prior to discharge. Daughter with concerns about patients ability to care for himself post surgery. Reports he is relatively independent with cares at home, his wife and him have help of a PCA during day. Uses a walker at baseline (did not bring to hospital) and has safety railings throughout house.     Strong concern surrounding post operative care of scrotum. Has a follow-up scheduled tomorrow in clinic in which they can hopefully help address the concerns. He appears to be close to his baseline in terms of strength. Given his age, conditioning, and parkinson's disease is a constant fall risk. Witnessed patient able to support his own weight in bathroom and with guard rail. He needed two assistants without a walker present.  Will need to use walker at home and wheel chair as normal. Vehicle reported as  easy for patient to get into.  At time of discharge patient appeared to be at his baseline status prior to the procedure.         Last vitals:  Vitals:    09/13/17 1118 09/13/17 1345   BP: 130/73 (!) 128/94   Resp: 18 12   Temp: 97.1  F (36.2  C) 97.8  F (36.6  C)   SpO2: 96% 96%         Electronically Signed By: Raj Peterson MD  September 13, 2017  1:54 PM

## 2017-09-13 NOTE — IP AVS SNAPSHOT
[facility] [place]    [address] [place] [address]    Phone:  [phone]                                       After Visit Summary   9/13/2017    Андрей Lawson    MRN: [id number]           After Visit Summary Signature Page     I have received my discharge instructions, and my questions have been answered. I have discussed any challenges I see with this plan with the nurse or doctor.    ..........................................................................................................................................  Patient/Patient Representative Signature      ..........................................................................................................................................  Patient Representative Print Name and Relationship to Patient    ..................................................               ................................................  Date                                            Time    ..........................................................................................................................................  Reviewed by Signature/Title    ...................................................              ..............................................  Date                                                            Time

## 2017-09-13 NOTE — IP AVS SNAPSHOT
MRN:[id number]                      After Visit Summary   9/13/2017    Андрей Lawson    MRN: [id number]           Thank you!     Thank you for choosing [facility] for your care. Our goal is always to provide you with excellent care. Hearing back from our patients is one way we can continue to improve our services. Please take a few minutes to complete the written survey that you may receive in the mail after you visit with us. Thank you!        Patient Information     Date Of Birth          1940        About your hospital stay     You were admitted on:  September 13, 2017 You last received care in the:  [facility]    You were discharged on:  September 13, 2017       Who to Call     For medical emergencies, please call 911.  For non-urgent questions about your medical care, please call your primary care provider or clinic, None  For questions related to your surgery, please call your surgery clinic        Attending Provider     Provider Bola Luke MD Urology       Primary Care Provider    None      After Care Instructions     Diet Instructions       Resume pre procedure diet            Discharge Instructions       Patient to arrange for follow up appointment tomorrow 9/14/17 for drain removal.    Return to clinic to see Dr. Warner 2 weeks.            Dressing       Keep dressing clean and dry, change as instructed by Surgeon or RN    Fluffs and a scrotal support x 1 week.            Ice to affected area       Ice to operative site.  PRN as tolerated            No Alcohol       for 24 hours post procedure            No driving or operating machinery        until the day after procedure            No lifting over 15 pounds and no strenuous physical activity       for 2 weeks                  Further instructions from your care team       [facility]  Same-Day Surgery   Adult Discharge Orders & Instructions   For 24 hours after  surgery  1. Get plenty of rest.  A responsible adult must stay with you for at least 24 hours after you leave the hospital.   2. Do not drive or use heavy equipment.  If you have weakness or tingling, don't drive or use heavy equipment until this feeling goes away.  3. Do not drink alcohol.  4. Avoid strenuous or risky activities.  Ask for help when climbing stairs.   5. You may feel lightheaded.  IF so, sit for a few minutes before standing.  Have someone help you get up.   6. If you have nausea (feel sick to your stomach): Drink only clear liquids such as apple juice, ginger ale, broth or 7-Up.  Rest may also help.  Be sure to drink enough fluids.  Move to a regular diet as you feel able.  7. You may have a slight fever. Call the doctor if your fever is over 100 F (37.7 C) (taken under the tongue) or lasts longer than 24 hours.  8. You may have a dry mouth, a sore throat, muscle aches or trouble sleeping.  These should go away after 24 hours.  9. Do not make important or legal decisions.   Call your doctor for any of the followin.  Signs of infection (fever, growing tenderness at the surgery site, a large amount of drainage or bleeding, severe pain, foul-smelling drainage, redness, swelling).    2. It has been over 8 to 10 hours since surgery and you are still not able to urinate (pass water).    3.  Headache for over 24 hours.    4.  Numbness, tingling or weakness the day after surgery (if you had spinal anesthesia).      Pending Results     No orders found from 2017 to 2017.            Admission Information     Date & Time Provider Department Dept. Phone    2017 Bola Warner MD [facility] [place] [phone]      Your Vitals Were     Blood Pressure Temperature Respirations Pulse Oximetry          128/94 97.8  F (36.6  C) (Temporal) 12 96%        MyChart Information     MyChart lets you send messages to your doctor, view your test results, renew your prescriptions, schedule  "appointments and more. To sign up, go to [email or web address] . Click on \"Log in\" on the left side of the screen, which will take you to the Welcome page. Then click on \"Sign up Now\" on the right side of the page.     You will be asked to enter the access code listed below, as well as some personal information. Please follow the directions to create your username and password.     Your access code is: [email or web address]  Expires: 2017  2:44 PM     Your access code will  in 90 days. If you need help or a new code, please call your [facility] clinic or [phone].        Care EveryWhere ID     This is your Care EveryWhere ID. This could be used by other organizations to access your [facility] medical records  [id number]        Equal Access to Services     TRACI HANLEY : Anali Simons, jr bernstein, zi mosqueda, maryan patel . So [facility] [phone].    ATENCIÓN: Si habla español, tiene a almaguer disposición servicios gratuitos de asistencia lingüística. Paulie al [phone].    We comply with applicable federal civil rights laws and [place] laws. We do not discriminate on the basis of race, color, national origin, age, disability sex, sexual orientation or gender identity.               Review of your medicines      START taking        Dose / Directions    HYDROcodone-acetaminophen 5-325 MG per tablet   Commonly known as:  NORCO   Used for:  Other hydrocele        Dose:  1-2 tablet   Take 1-2 tablets by mouth every 4 hours as needed for moderate to severe pain (Moderate to Severe Pain)   Quantity:  24 tablet   Refills:  0         CONTINUE these medicines which have NOT CHANGED        Dose / Directions    carbidopa-levodopa  MG per tablet   Commonly known as:  SINEMET        Dose:  2 tablet   Take 2 tablets by mouth   Refills:  0       carvedilol 25 MG tablet   Commonly known as:  COREG        Dose:  25 mg   Take 25 mg by mouth   Refills:  0       " hypromellose 0.3 % Soln ophthalmic solution   Commonly known as:  GENTEAL        Dose:  1 drop   Apply 1 drop to eye   Refills:  0       PANTOPRAZOLE SODIUM PO        Dose:  20 mg   Take 20 mg by mouth 2 times daily (before meals)   Refills:  0       sertraline 50 MG tablet   Commonly known as:  ZOLOFT        Dose:  25 mg   25 mg   Refills:  0       TRAZODONE HCL PO        Refills:  0            Where to get your medicines      Some of these will need a paper prescription and others can be bought over the counter. Ask your nurse if you have questions.     Bring a paper prescription for each of these medications     HYDROcodone-acetaminophen 5-325 MG per tablet                Protect others around you: Learn how to safely use, store and throw away your medicines at www.disposemymeds.org.             Medication List: This is a list of all your medications and when to take them. Check marks below indicate your daily home schedule. Keep this list as a reference.      Medications           Morning Afternoon Evening Bedtime As Needed    carbidopa-levodopa  MG per tablet   Commonly known as:  SINEMET   Take 2 tablets by mouth                                carvedilol 25 MG tablet   Commonly known as:  COREG   Take 25 mg by mouth                                HYDROcodone-acetaminophen 5-325 MG per tablet   Commonly known as:  NORCO   Take 1-2 tablets by mouth every 4 hours as needed for moderate to severe pain (Moderate to Severe Pain)                                hypromellose 0.3 % Soln ophthalmic solution   Commonly known as:  GENTEAL   Apply 1 drop to eye                                PANTOPRAZOLE SODIUM PO   Take 20 mg by mouth 2 times daily (before meals)                                sertraline 50 MG tablet   Commonly known as:  ZOLOFT   25 mg                                TRAZODONE HCL PO                                          More Information        Hydrocele Surgery (Hydrocelectomy)    A hydrocele is  a sac of fluid that forms around a testicle. It occurs when fluid builds up in the layer of tissue that covers the testicle. It may be caused by an infection or by injury to the testicle. But the cause is often not known. A large hydrocele can cause pain or swelling in the scrotum. Hydrocelectomy is surgery to remove the hydrocele.  Preparing for surgery  Prepare for the surgery as you have been told. In addition:    Tell your doctor about all medicines you take. This includes herbs and other supplements. It also includes any blood thinners, such as warfarin, clopidogrel, or daily aspirin. You may need to stop taking some or all of them before surgery as instructed by your doctor.    Follow any directions you are given for not eating or drinking before surgery.      After the surgery  You will be taken to a room to recover from the anesthesia. A nurse will check on you to make sure you re not in pain. You may feel sleepy and nauseated. If a breathing tube was used, your throat may be sore at first. An ice pack may be applied to the surgical area. This helps reduce swelling. You may also be given a jockstrap to wear. This helps relieve pain and swelling, and prevents injury. Once you are ready to go home, have an adult family member or friend drive you.  Recovering at home  Follow the instructions you have been given to care for yourself. During your recovery:    To help reduce swelling, apply an ice pack or cold compress to the scrotum as directed. Do this for no longer than 15 minutes at a time. Continue using the cold pack for 2 days or until swelling improves.    Take prescribed pain medicine as directed.    Care for your incision as instructed.    Follow your doctor s guidelines for showering. Avoid swimming, bathing, using a hot tub, and other activities that cause the incision to be covered with water until your doctor says it s OK.    Wear a jockstrap or snug underwear as directed.    Avoid heavy lifting and  strenuous exercise as directed.    Do not have sex for 4 weeks.    Do not drive until you are no longer taking pain medicine and your doctor says it s OK.  When should I call my doctor?  Call the doctor if you have any of the following:    Chest pain or trouble breathing (call 911)    Fever of 100.4 F (38.0 C) or higher    Symptoms of infection at the incision site such as increased redness or swelling, warmth, worsening pain, or foul-smelling drainage    Bleeding from the incision site    Pain gets worse or is not relieved by pain medicine    Increased pain or swelling in the scrotum or groin area   Follow-up care  You will have follow-up visits with your doctor to check on your healing. You may also have sutures that need to be removed. Be sure to tell your doctor if you have any questions or concerns about your recovery.  Risks and complications  Risk and possible complications include:    Bleeding    Infection    Blood clots    Recurrence of the hydrocele    Injury to the testicle and nearby structures, which can lead to infertility    Risks of anesthesia (the anesthesiologist will discuss these with you)    Date Last Reviewed: 7/6/2015 2000-2017 [employer]. [address] [place] [address]. All rights reserved. This information is not intended as a substitute for professional medical care. Always follow your healthcare professional's instructions.                Discharge Instructions: Caring for Your Amarjit-Downey Drainage Tube  Your doctor discharges you with a Amarjit-Downey drainage tube. Doctors commonly leave this drain within the abdominal cavity after surgery. It helps drain and collect blood and body fluid after surgery. This can prevent swelling and reduces the risk for infection. The tube is held in place by a few stitches. It is covered with a bandage. Your doctor will remove the drain when he or she determines you no longer need it.  Home care    Don t sleep on the same  side as the tube.    Secure the tube and bag inside your clothing with a safety pin. This helps keep the tube from being pulled out.    Empty your drain at least twice a day. Empty it more often if the drain is full. Wash  and dry your hands before emptying the drain.    Lift the opening on the drain.    Drain the fluid into a measuring cup.    Record the amount of fluid each time you empty the drain. Include the date and time it was emptied. Share this information with your doctor on your next visit.    Squeeze the bulb with your hands until you hear air coming out of the bulb if your doctor has instructed you to do so (sometimes the bulb is used as a reservoir without suction). Check with your doctor about specific drain instructions.    Close the opening.    Change the dressing around the tube every day.    Wash your hands.    Remove the old bandage.    Wash your hands again.    Wet a cotton swab and clean the skin around the incision and tube site. Use normal saline solution (salt and water). Or, you can use warm, soapy water.    Put a new bandage on the incision and tube site. Make the bandage large enough to cover the whole incision area.    Tape the bandage in place.    Keep the bandage and tube site dry when you shower. Ask your healthcare provider about the best way to do this.     Stripping  the tube helps keep blood clots from blocking the tube. Ask your nurse how often you should strip the tube. Stripping may not be needed, depending on where and why your doctor placed the tube. It may even be dangerous in some cases.     Hold the tubing where it leaves the skin, with one hand. This keeps it from pulling on the skin.    Pinch the tubing with the thumb and first finger of your other hand.    Slowly and firmly pull your thumb and first finger down the tubing. You may find it helpful to hold an alcohol swab between your fingers and the tube to lubricate the tubing.    If the pulling hurts or feels like it s  coming out of the skin, stop. Begin again more gently.  Follow-up care  Make a follow-up appointment as directed by our staff.     When to seek medical care  Call your healthcare provider right away if you have any of the following:    New or increased pain around the tube    Redness, swelling, or warmth around the incision or tube    Drainage that is foul-smelling    Vomiting    Fever of 100.4 F (38 C)    Fluid leaking around the tube    Incision seems not to be healing    Stitches become loose    Tube falls out or breaks    Drainage that changes from light pink to dark red    Blood clots in the drainage bulb    A sudden increase or decrease in the amount of drainage (over 30 mL)   Date Last Reviewed: 2/1/2017 2000-2017 [employer]. [address] [place] [address], [place] [address]. All rights reserved. This information is not intended as a substitute for professional medical care. Always follow your healthcare professional's instructions.

## 2017-09-13 NOTE — BRIEF OP NOTE
[facility] Brief Operative Note    Pre-operative diagnosis: left hydrocele   Post-operative diagnosis * No post-op diagnosis entered *   Procedure: Procedure(s):  Left hydrocele repair with drain placement - Wound Class: I-Clean   Surgeon: Bola Warner MD   Assistants(s): OR scrub staff   Estimated blood loss: 5cc    Specimens: None   Findings: Simple left hydrocele.

## 2017-09-13 NOTE — DISCHARGE INSTRUCTIONS
[facility]  Same-Day Surgery   Adult Discharge Orders & Instructions   For 24 hours after surgery  1. Get plenty of rest.  A responsible adult must stay with you for at least 24 hours after you leave the hospital.   2. Do not drive or use heavy equipment.  If you have weakness or tingling, don't drive or use heavy equipment until this feeling goes away.  3. Do not drink alcohol.  4. Avoid strenuous or risky activities.  Ask for help when climbing stairs.   5. You may feel lightheaded.  IF so, sit for a few minutes before standing.  Have someone help you get up.   6. If you have nausea (feel sick to your stomach): Drink only clear liquids such as apple juice, ginger ale, broth or 7-Up.  Rest may also help.  Be sure to drink enough fluids.  Move to a regular diet as you feel able.  7. You may have a slight fever. Call the doctor if your fever is over 100 F (37.7 C) (taken under the tongue) or lasts longer than 24 hours.  8. You may have a dry mouth, a sore throat, muscle aches or trouble sleeping.  These should go away after 24 hours.  9. Do not make important or legal decisions.   Call your doctor for any of the followin.  Signs of infection (fever, growing tenderness at the surgery site, a large amount of drainage or bleeding, severe pain, foul-smelling drainage, redness, swelling).    2. It has been over 8 to 10 hours since surgery and you are still not able to urinate (pass water).    3.  Headache for over 24 hours.    4.  Numbness, tingling or weakness the day after surgery (if you had spinal anesthesia).

## 2017-09-13 NOTE — ANESTHESIA CARE TRANSFER NOTE
Patient: Андрей Lawson    Procedure(s):  Left hydrocele repair with drain placement - Wound Class: I-Clean    Diagnosis: left hydrocele  Diagnosis Additional Information: No value filed.    Anesthesia Type:   General, LMA     Note:  Airway :Nasal Cannula  Patient transferred to:PACU  Comments: To PACU, awake, comfortable, sats 99%, NC, Report to Rn.      Vitals: (Last set prior to Anesthesia Care Transfer)    CRNA VITALS  9/13/2017 1308 - 9/13/2017 1343      9/13/2017             Pulse: 71    SpO2: 96 %                Electronically Signed By: LOTUS Roland CRNA  September 13, 2017  1:43 PM

## 2017-09-14 ENCOUNTER — TELEPHONE (OUTPATIENT)
Dept: UROLOGY | Facility: CLINIC | Age: 77
End: 2017-09-14

## 2017-09-14 NOTE — TELEPHONE ENCOUNTER
Patient was scheduled for a scrotal drain removal today at 3:00 pm. Daughter arrived around 3:00 pm and was waiting for her dad and brother to arrive. Around 4:00 pm she called back and said she left to go to her father's house to see where they were because she has been calling and no one is answering and she does not know where they are. Nurse explained that he would need to schedule an appointment tomorrow to get the drain out since the clinic closes at 5pm. Emma stated he could not wait that long and needed it out tonight, she said she wanted an order to remove it at the hospital. She does not want him to wait till tomorrow because it is bothersome for him and unsanitary, she is afraid it will get infection or the fluid will drain back into the scrotum. Nurse explained that it is placed sterilly and patients can have a drain in for a few days at a time and the the bulb has suction to only pull fluid out.   Nurse called [facility] and they stated they would not be able to take out. Nurse then called Dr. Warner for recommendations, he recommended they not go to the ER and he either come in tomorrow or that his daughter could take it out at home.  Nurse called the patient and he answered. Nurse asked if he would be able to return tomorrow to remove the drain, he said it is not comfortable but he would try. He said he ended up having transportation issues today. Nurse then returned call to Emma and updated her on the recommendations from Dr. Warner. She said she would not be able to remove the drain and scheduled an appointment tomorrow morning, but said she will bring him to the ER if he cannot handle having it one more night.    Rose Almonte RN, BSN  [facility]  GI/Gen Surg/Hepatology Care Coordinator

## 2017-09-14 NOTE — TELEPHONE ENCOUNTER
Reason for Disposition    Caller has URGENT question and triager unable to answer question     Daughter called in with severe anxiety and questions about wound drain and post op dishcarge instructions, and patient safety at home, and excessive shanking and history of parkinson's. She is unwilling to stay with patient over night, educated her that the increased shaking is quite common after anesthesia, educated on wound drain, but she is unwilling to look at the incision site, as she is uncomfortable given this her dad, but unable to truly assess if any signs of infection or if any drain migration, she did change him earlier and put him in some underwear and there were a few small areas of blood on his dressing but none outside of the drain. Provided her reassurance and education and she has appointment for 3 pm tomorrow but still very anxious and recommended paging on call surgeon to further discuss concerns.    Additional Information    Negative: Chest pain    Negative: Difficulty breathing    Negative: Surgical incision symptoms and questions    Negative: [1] Discomfort (pain, burning or stinging) when passing urine AND [2] male    Negative: [1] Discomfort (pain, burning or stinging) when passing urine AND [2] female    Negative: Constipation    Negative: Calf pain    Negative: Dizziness is severe, or persists > 24 hours after surgery    Negative: Pain, redness, swelling, or pus at IV Site    Negative: Symptoms arising from use of a urinary catheter (Torres or Coude)    Negative: Cast problems or questions    Negative: Medication question    Negative: Sounds like a life-threatening emergency to the triager    Negative: [1] Widespread rash AND [2] bright red, sunburn-like    Negative: [1] SEVERE headache AND [2] after spinal (epidural) anesthesia    Negative: [1] Vomiting AND [2] persists > 4 hours    Negative: [1] Vomiting AND [2] abdomen looks much more swollen than usual    Negative: [1] Drinking very little  AND [2] dehydration suspected (e.g., no urine > 12 hours, very dry mouth, very lightheaded)    Negative: Patient sounds very sick or weak to the triager    Negative: Sounds like a serious complication to the triager    Negative: Fever > 100.5 F (38.1 C)    Negative: [1] SEVERE post-op pain (e.g., excruciating, pain scale 8-10) AND [2] not controlled with pain medications    Protocols used: POST-OP SYMPTOMS AND QUESTIONS-ADULT-    Viv on call Surgeon Dr. Machado via page  at 9:30 pm, to call Neymar Carter at [phone] to discuss post op concerns regarding drain and incision care.     Jeniffer Garcias, RN, BSN  [facility]

## 2017-09-15 ENCOUNTER — ALLIED HEALTH/NURSE VISIT (OUTPATIENT)
Dept: NURSING | Facility: CLINIC | Age: 77
End: 2017-09-15
Payer: COMMERCIAL

## 2017-09-15 DIAGNOSIS — Z98.890 HISTORY OF HYDROCELECTOMY: Primary | ICD-10-CM

## 2017-09-15 PROCEDURE — 99024 POSTOP FOLLOW-UP VISIT: CPT

## 2017-09-15 NOTE — MR AVS SNAPSHOT
After Visit Summary   9/15/2017    Андрей Lawson    MRN: [id number]           Patient Information     Date Of Birth          1940        Visit Information        Provider Department      9/15/2017 10:00 AM NURSE ONLY SURGERY [facility]        Care Instructions    If you have any questions or concerns, please call the [facility] at [phone] and ask to talk to the Urology Nurse.           Follow-ups after your visit        Your next 10 appointments already scheduled     Sep 27, 2017  9:30 AM CDT   Return Visit with Bola Warner MD   [facility] ([facility])    [address] [address] [place] [id number] [phone]              Who to contact     If you have questions or need follow up information about today's clinic visit or your schedule please contact [facility] directly at [phone].  Normal or non-critical lab and imaging results will be communicated to you by MyChart, letter or phone within 4 business days after the clinic has received the results. If you do not hear from us within 7 days, please contact the clinic through [facility] or phone. If you have a critical or abnormal lab result, we will notify you by phone as soon as possible.  Submit refill requests through [employer] or call your pharmacy and they will forward the refill request to us. Please allow 3 business days for your refill to be completed.          Additional Information About Your Visit        [facility] Information     [employer] is an electronic gateway that provides easy, online access to your medical records. With [employer], you can request a clinic appointment, read your test results, renew a prescription or communicate with your care team.     To sign up for [employer] visit the website at [email or web address]   You will be asked to enter the access code listed below, as well as some personal information. Please follow  the directions to create your username and password.     Your access code is: [email or web address]  Expires: 2017  2:44 PM     Your access code will  in 90 days. If you need help or a new code, please contact your [facility] or call [phone] for assistance.        Care EveryWhere ID     This is your Care EveryWhere ID. This could be used by other organizations to access your [place] medical records  [id number]         Blood Pressure from Last 3 Encounters:   17 155/87   17 131/78    Weight from Last 3 Encounters:   17 99.8 kg (220 lb)              Today, you had the following     No orders found for display       Primary Care Provider    None       No address on file        Equal Access to Services     TRACI HANLEY : Hadii lary alvaradoo Soantonia, waaxda luqadaha, qaybta kaalmada adeegyada, maryan patel . So [facility] [phone].    ATENCIÓN: Si habla español, tiene a almaguer disposición servicios gratuitos de asistencia lingüística. Llame al [phone].    We comply with applicable federal civil rights laws and [place] laws. We do not discriminate on the basis of race, color, national origin, age, disability sex, sexual orientation or gender identity.            Thank you!     Thank you for choosing [facility]  for your care. Our goal is always to provide you with excellent care. Hearing back from our patients is one way we can continue to improve our services. Please take a few minutes to complete the written survey that you may receive in the mail after your visit with us. Thank you!             Your Updated Medication List - Protect others around you: Learn how to safely use, store and throw away your medicines at www.disposemymeds.org.          This list is accurate as of: 9/15/17 11:09 AM.  Always use your most recent med list.                   Brand Name Dispense Instructions for use Diagnosis     carbidopa-levodopa  MG per tablet    SINEMET     Take 2 tablets by mouth        carvedilol 25 MG tablet    COREG     Take 25 mg by mouth        HYDROcodone-acetaminophen 5-325 MG per tablet    NORCO    24 tablet    Take 1-2 tablets by mouth every 4 hours as needed for moderate to severe pain (Moderate to Severe Pain)    Other hydrocele       hypromellose 0.3 % Soln ophthalmic solution    GENTEAL     Apply 1 drop to eye        PANTOPRAZOLE SODIUM PO      Take 20 mg by mouth 2 times daily (before meals)        sertraline 50 MG tablet    ZOLOFT     25 mg        TRAZODONE HCL PO

## 2017-09-15 NOTE — PATIENT INSTRUCTIONS
If you have any questions or concerns, please call the [facility] at [phone] and ask to talk to the Urology Nurse.

## 2017-09-15 NOTE — NURSING NOTE
"Андрей Lawson comes into clinic today at the request of Dr. Warner for drain removal.    Patient expressed concern with continued swelling. Patient stated, \"I did the surgery and it doesn't seem like it did anything. It is still large like it was before and I have to use the urinal to go to the bathroom.\" Per daughter, patient gets overwhelmed easily and it has been difficult for patient to manage at home the past few days. Daughter stated, \"He hasn't slept for 2 days.\" Patient reports that he has not been applying ice packs or wearing scrotal support at home. Encouragement provided and questions answered. Patient stated, \"My drain is supposed to come out today, but what if we need it in longer because I am still so swollen?\" Informed patient that Dr. Warner is out of the office, but that a page would be sent.    Paged Dr. Warner who returned call and was informed of the above information. Per Dr. Warner, the scrotum will be puffy, soft, with some bruising. Dr. Warner reports that the hydrocele was removed, approximately 12 oz of fluid were removed during surgery, and the drain can be removed today.    Writer returned to room and updated patient and daughter with recommendation from Dr. Warner. Patient agreed with plan. Upon assessment, VINOD drain tubing was not connected to the collection bulb and bulb was not compressed. Scant amount of serosanguinous drainage noted in tubing. Per patient, the bulb was compressed at home and approximately 50cc's of drainage was emptied 3 times. Scrotum puffy, soft, with small amount of bruising noted.    1 stitch at VINOD tubing site removed and VINOD drain removed with ease. Area cleansed with sterile water and gauze. Dry gauze pad applied. Patient given ice pack. Encouraged patient to wear supportive underwear and apply ice packs intermittently. Patient aware to have a layer of clothing or towel between the skin and ice pack.    Follow up with Dr. Warner scheduled for 9/27/17 at " 9:30am.    This service provided today was under the supervising provider of the day Pavithra Lawton PA-C, who was available if needed.    Reason for visit: Drain removal    Diane Andino RN, BSN

## 2017-09-21 NOTE — OP NOTE
[facility] Operative Note     Pre-operative diagnosis: left hydrocele   Post-operative diagnosis * No post-op diagnosis entered *   Procedure: Procedure(s):  Left hydrocele repair with drain placement - Wound Class: I-Clean   Surgeon: Bola Warner MD   Assistants(s): OR scrub staff   Estimated blood loss: 5cc                    Specimens: None   Findings: Simple left hydrocele.     Indications  Андрей Lawson is a 76 year old male with left hydrocele.  This affects his urination as the penis retracts when he's sitting.  He requests surgical repair, after  Having discussed the risks benefits and alternatives.    Procedure.  Patient was taken to the operating room and placed supine.  General anesthesia was induced without complication.  The genitals were prepped and draped in standard fashion.  A time out was taken with the OR personnel.  pneumoboots and pre-op antibiotics were utilized.    A 5cm vertical incision was marked on the median raphe, and local anesthetic infiltrated.  The skin was incised sharply, and cautery was used to divide the dartos muscle.  The tunica vaginalis was identified and incised with a 15 blade.  Most of the fluid was evacuated and the testis within the sac was delivered.  The sac was opened and excess tunica vaginalis was excised.  The redundant sac was closed around the back of the epididymis with running 3-0 chromic suture. Hemostasis was excellent.  The testis was irrigated and returned into the hemiscrotum.  A 10F round VINOD was placed and secured to the skin with a Nylon drain stitch.    Dartos was closed with a running 3-0 chromic.  Skin was closed with running subcuticular 4-0 monocryl.  Bacitracin, fluffs and an athletic supporter were applied.  Pt was awoken from anesthesia in stable condition and transported to recovery.  He will follow-up as an outpatient.    I was present and scrubbed for the entire procedure.  Bola Warner MD  Urology Staff

## 2017-09-27 ENCOUNTER — OFFICE VISIT (OUTPATIENT)
Dept: UROLOGY | Facility: CLINIC | Age: 77
End: 2017-09-27
Payer: COMMERCIAL

## 2017-09-27 VITALS — SYSTOLIC BLOOD PRESSURE: 140 MMHG | DIASTOLIC BLOOD PRESSURE: 77 MMHG | HEART RATE: 61 BPM

## 2017-09-27 DIAGNOSIS — Z09 POSTOP CHECK: Primary | ICD-10-CM

## 2017-09-27 PROCEDURE — 99024 POSTOP FOLLOW-UP VISIT: CPT | Performed by: UROLOGY

## 2017-09-27 ASSESSMENT — PAIN SCALES - GENERAL: PAINLEVEL: NO PAIN (0)

## 2017-09-27 NOTE — MR AVS SNAPSHOT
After Visit Summary   2017    Андрей Lawson    MRN: [id number]           Patient Information     Date Of Birth          1940        Visit Information        Provider Department      2017 9:30 AM Bola Warner MD [facility]         Follow-ups after your visit        Your next 10 appointments already scheduled     Oct 18, 2017 11:30 AM CDT   Return Visit with Bola Warner MD   [facility] ([facility])    [address] [place] [id number] [phone]              Who to contact     If you have questions or need follow up information about today's clinic visit or your schedule please contact [facility] directly at [phone].  Normal or non-critical lab and imaging results will be communicated to you by MyChart, letter or phone within 4 business days after the clinic has received the results. If you do not hear from us within 7 days, please contact the clinic through MyChart or phone. If you have a critical or abnormal lab result, we will notify you by phone as soon as possible.  Submit refill requests through [employer] or call your pharmacy and they will forward the refill request to us. Please allow 3 business days for your refill to be completed.          Additional Information About Your Visit        MyChart Information     [employer] is an electronic gateway that provides easy, online access to your medical records. With [employer], you can request a clinic appointment, read your test results, renew a prescription or communicate with your care team.     To sign up for [employer] visit the website at [email or web address]   You will be asked to enter the access code listed below, as well as some personal information. Please follow the directions to create your username and password.     Your access code is: [email or web address]  Expires: 2017  2:44 PM     Your access code will  in 90  days. If you need help or a new code, please contact your [facility] or call [phone] for assistance.        Care EveryWhere ID     This is your Care EveryWhere ID. This could be used by other organizations to access your [place] medical records  [id number]        Your Vitals Were     Pulse                   61            Blood Pressure from Last 3 Encounters:   09/27/17 140/77   09/13/17 155/87   08/23/17 131/78    Weight from Last 3 Encounters:   08/23/17 99.8 kg (220 lb)              Today, you had the following     No orders found for display       Primary Care Provider    None Specified       No primary provider on file.        Equal Access to Services     [facility]: Hadii lary Simons, jr bernstein, zi boydalmajoy mosqueda, maryan patel . So [facility] [phone].    ATENCIÓN: Si habla español, tiene a almaguer disposición servicios gratuitos de asistencia lingüística. Llame al [phone].    We comply with applicable federal civil rights laws and [place] laws. We do not discriminate on the basis of race, color, national origin, age, disability sex, sexual orientation or gender identity.            Thank you!     Thank you for choosing [facility]  for your care. Our goal is always to provide you with excellent care. Hearing back from our patients is one way we can continue to improve our services. Please take a few minutes to complete the written survey that you may receive in the mail after your visit with us. Thank you!             Your Updated Medication List - Protect others around you: Learn how to safely use, store and throw away your medicines at www.disposemymeds.org.          This list is accurate as of: 9/27/17 11:08 AM.  Always use your most recent med list.                   Brand Name Dispense Instructions for use Diagnosis    carbidopa-levodopa  MG per tablet    SINEMET     Take 2 tablets by  mouth        carvedilol 25 MG tablet    COREG     Take 25 mg by mouth        HYDROcodone-acetaminophen 5-325 MG per tablet    NORCO    24 tablet    Take 1-2 tablets by mouth every 4 hours as needed for moderate to severe pain (Moderate to Severe Pain)    Other hydrocele       hypromellose 0.3 % Soln ophthalmic solution    GENTEAL     Apply 1 drop to eye        PANTOPRAZOLE SODIUM PO      Take 20 mg by mouth 2 times daily (before meals)        sertraline 50 MG tablet    ZOLOFT     25 mg        TRAZODONE HCL PO

## 2017-09-27 NOTE — NURSING NOTE
"Андрей Lawson's goals for this visit include:   Chief Complaint   Patient presents with     Surgical Followup     He requests these members of his care team be copied on today's visit information: YES - PCP     Initial /77 (BP Location: Left arm, Patient Position: Chair, Cuff Size: Adult Regular)  Pulse 61 Estimated body mass index is 31.57 kg/(m^2) as calculated from the following:    Height as of 8/23/17: 1.778 m (5' 10\").    Weight as of 8/23/17: 99.8 kg (220 lb).  BP completed using cuff size: regular        "

## 2017-10-01 NOTE — PROGRESS NOTES
CC: Андрей Lawson is post-op from left hydrocele repair 2 weeks ago.  HPI: Patient is 2 wks post op.  he has been doing OK. No fevers or chills. Pain decreasing.   He reports a left scrotal fluid collection that bothers because he dribbles with voiding due to retracted penis.  He was not able to ice it or really support it because he doesn't have help at home.    Exam: /77 (BP Location: Left arm, Patient Position: Chair, Cuff Size: Adult Regular)  Pulse 61     No apparent distress   Incision healing well. No discharge, erythema or fluctuence suggestive of infection.   He has a left scrotal fluid collection about the size of a lime.  This is taut, consistent with post-op seroma.    PLAN:   Advised return to clinic in 3-4 weeks.  If this fluid in the left hemiscrotum doesn't reabsorb with time, I will schedule a procedure to drain the seroma.    Giovanni CAI

## 2017-10-18 ENCOUNTER — OFFICE VISIT (OUTPATIENT)
Dept: UROLOGY | Facility: CLINIC | Age: 77
End: 2017-10-18
Payer: COMMERCIAL

## 2017-10-18 VITALS — SYSTOLIC BLOOD PRESSURE: 104 MMHG | HEART RATE: 60 BPM | DIASTOLIC BLOOD PRESSURE: 67 MMHG

## 2017-10-18 DIAGNOSIS — Z09 POSTOP CHECK: Primary | ICD-10-CM

## 2017-10-18 DIAGNOSIS — N43.2 OTHER HYDROCELE: ICD-10-CM

## 2017-10-18 PROCEDURE — 99024 POSTOP FOLLOW-UP VISIT: CPT | Performed by: UROLOGY

## 2017-10-18 NOTE — PATIENT INSTRUCTIONS
Call [phone] to schedule follow up with Dr. Warner if no improvement in 1 month. Call sooner if symptoms worsen.

## 2017-10-18 NOTE — PROGRESS NOTES
Pt here for follow-up.  Status-post left hydrocele repair 9/13/17.    He was last seen 9/27/17 with a lime sized post-op left scrotal seroma.  Here for follow-up.    He has been bothered with the swelling because his penis retracts and it's hard to void sitting.    On exam today, he has some mild residual induration around the left testis.  Transilluminate reveals a small fluid collection, estimate 25cc.  I Prepped and numbed a small area with 1% lidocaine.  I tried to aspirate the fluid with a 20ga needle, and there was no return.  I was able to express a small amount of seroma fluid through the needle tract, perhaps 8cc.    I reassured him there is not a drainable collection.  Most of the size is just residual swelling that should continue to decrease with time.    PRN follow-up at this time.  Visit within post-op global.   Giovanni CAI

## 2017-10-18 NOTE — MR AVS SNAPSHOT
After Visit Summary   10/18/2017    Андрей Lawson    MRN: [id number]           Patient Information     Date Of Birth          1940        Visit Information        Provider Department      10/18/2017 11:30 AM Bola Warner MD [facility]        Care Instructions    Call [phone] to schedule follow up with Dr. Warner if no improvement in 1 month. Call sooner if symptoms worsen.          Follow-ups after your visit        Who to contact     If you have questions or need follow up information about today's clinic visit or your schedule please contact [facility] directly at [phone].  Normal or non-critical lab and imaging results will be communicated to you by MyChart, letter or phone within 4 business days after the clinic has received the results. If you do not hear from us within 7 days, please contact the clinic through MyChart or phone. If you have a critical or abnormal lab result, we will notify you by phone as soon as possible.  Submit refill requests through Vusion or call your pharmacy and they will forward the refill request to us. Please allow 3 business days for your refill to be completed.          Additional Information About Your Visit        MyChart Information     Vusion is an electronic gateway that provides easy, online access to your medical records. With Vusion, you can request a clinic appointment, read your test results, renew a prescription or communicate with your care team.     To sign up for [employer] visit the website at [email or web address]   You will be asked to enter the access code listed below, as well as some personal information. Please follow the directions to create your username and password.     Your access code is: [email or web address]  Expires: 2017  2:44 PM     Your access code will  in 90 days. If you need help or a new code, please contact your [facility] or call  [phone] for assistance.        Care EveryWhere ID     This is your Care EveryWhere ID. This could be used by other organizations to access your [facility] medical records  [id number]        Your Vitals Were     Pulse                   60            Blood Pressure from Last 3 Encounters:   10/18/17 104/67   09/27/17 140/77   09/13/17 155/87    Weight from Last 3 Encounters:   08/23/17 99.8 kg (220 lb)              Today, you had the following     No orders found for display       Primary Care Provider Office Phone # Fax #    Josef Rodriguez [phone]       [facility] [address]  [place] [address]        Equal Access to Services     TRACI HANLEY : Hadii lary Simons, waaxda luqadaha, qaybta kaalmada jaylin, maryan warner. So [facility] [phone].    ATENCIÓN: Si habla español, tiene a almaguer disposición servicios gratuitos de asistencia lingüística. Llame al [phone].    We comply with applicable federal civil rights laws and [place] laws. We do not discriminate on the basis of race, color, national origin, age, disability, sex, sexual orientation, or gender identity.            Thank you!     Thank you for choosing [facility]  for your care. Our goal is always to provide you with excellent care. Hearing back from our patients is one way we can continue to improve our services. Please take a few minutes to complete the written survey that you may receive in the mail after your visit with us. Thank you!             Your Updated Medication List - Protect others around you: Learn how to safely use, store and throw away your medicines at www.disposemymeds.org.          This list is accurate as of: 10/18/17 12:15 PM.  Always use your most recent med list.                   Brand Name Dispense Instructions for use Diagnosis    carbidopa-levodopa  MG per tablet    SINEMET     Take 2 tablets by mouth        carvedilol 25 MG  tablet    COREG     Take 25 mg by mouth        HYDROcodone-acetaminophen 5-325 MG per tablet    NORCO    24 tablet    Take 1-2 tablets by mouth every 4 hours as needed for moderate to severe pain (Moderate to Severe Pain)    Other hydrocele       hypromellose 0.3 % Soln ophthalmic solution    GENTEAL     Apply 1 drop to eye        PANTOPRAZOLE SODIUM PO      Take 20 mg by mouth 2 times daily (before meals)        sertraline 50 MG tablet    ZOLOFT     25 mg        TRAZODONE HCL PO

## 2017-10-18 NOTE — NURSING NOTE
"Андрей Lawson's goals for this visit include:   Chief Complaint   Patient presents with     RECHECK       He requests these members of his care team be copied on today's visit information: yes    PCP: Josef Rodriguez    Referring Provider:  No referring provider defined for this encounter.    Chief Complaint   Patient presents with     RECHECK       Initial /67 (BP Location: Left arm, Patient Position: Chair, Cuff Size: Adult Regular)  Pulse 60 Estimated body mass index is 31.57 kg/(m^2) as calculated from the following:    Height as of 8/23/17: 1.778 m (5' 10\").    Weight as of 8/23/17: 99.8 kg (220 lb).  Medication Reconciliation: complete    "

## 2018-04-12 ENCOUNTER — RECORDS - HEALTHEAST (OUTPATIENT)
Dept: LAB | Facility: CLINIC | Age: 78
End: 2018-04-12

## 2018-04-13 ENCOUNTER — RECORDS - HEALTHEAST (OUTPATIENT)
Dept: LAB | Facility: CLINIC | Age: 78
End: 2018-04-13

## 2018-04-13 LAB
ALBUMIN UR-MCNC: ABNORMAL MG/DL
ANION GAP SERPL CALCULATED.3IONS-SCNC: 11 MMOL/L (ref 5–18)
APPEARANCE UR: CLEAR
BACTERIA #/AREA URNS HPF: ABNORMAL HPF
BILIRUB UR QL STRIP: NEGATIVE
BUN SERPL-MCNC: 21 MG/DL (ref 8–28)
CALCIUM SERPL-MCNC: 9 MG/DL (ref 8.5–10.5)
CHLORIDE BLD-SCNC: 100 MMOL/L (ref 98–107)
CO2 SERPL-SCNC: 23 MMOL/L (ref 22–31)
COLOR UR AUTO: YELLOW
CREAT SERPL-MCNC: 0.92 MG/DL (ref 0.7–1.3)
ERYTHROCYTE [DISTWIDTH] IN BLOOD BY AUTOMATED COUNT: 14.2 % (ref 11–14.5)
GFR SERPL CREATININE-BSD FRML MDRD: >60 ML/MIN/1.73M2
GLUCOSE BLD-MCNC: 92 MG/DL (ref 70–125)
GLUCOSE UR STRIP-MCNC: NEGATIVE MG/DL
HCT VFR BLD AUTO: 35.9 % (ref 40–54)
HGB BLD-MCNC: 12 G/DL (ref 14–18)
HGB UR QL STRIP: NEGATIVE
KETONES UR STRIP-MCNC: ABNORMAL MG/DL
LEUKOCYTE ESTERASE UR QL STRIP: ABNORMAL
MCH RBC QN AUTO: 34.5 PG (ref 27–34)
MCHC RBC AUTO-ENTMCNC: 33.4 G/DL (ref 32–36)
MCV RBC AUTO: 103 FL (ref 80–100)
MUCOUS THREADS #/AREA URNS LPF: ABNORMAL LPF
NITRATE UR QL: NEGATIVE
PH UR STRIP: 5.5 [PH] (ref 4.5–8)
PLATELET # BLD AUTO: 270 THOU/UL (ref 140–440)
PMV BLD AUTO: 9.9 FL (ref 8.5–12.5)
POTASSIUM BLD-SCNC: 4.1 MMOL/L (ref 3.5–5)
RBC # BLD AUTO: 3.48 MILL/UL (ref 4.4–6.2)
RBC #/AREA URNS AUTO: ABNORMAL HPF
SODIUM SERPL-SCNC: 134 MMOL/L (ref 136–145)
SP GR UR STRIP: 1.02 (ref 1–1.03)
SQUAMOUS #/AREA URNS AUTO: ABNORMAL LPF
UROBILINOGEN UR STRIP-ACNC: ABNORMAL
WBC #/AREA URNS AUTO: ABNORMAL HPF
WBC: 9.3 THOU/UL (ref 4–11)

## 2018-04-14 LAB — BACTERIA SPEC CULT: NO GROWTH

## 2018-04-16 ENCOUNTER — RECORDS - HEALTHEAST (OUTPATIENT)
Dept: LAB | Facility: CLINIC | Age: 78
End: 2018-04-16

## 2018-04-16 LAB
ERYTHROCYTE [DISTWIDTH] IN BLOOD BY AUTOMATED COUNT: 13.2 % (ref 11–14.5)
HCT VFR BLD AUTO: 37.6 % (ref 40–54)
HGB BLD-MCNC: 12.4 G/DL (ref 14–18)
MCH RBC QN AUTO: 35 PG (ref 27–34)
MCHC RBC AUTO-ENTMCNC: 33 G/DL (ref 32–36)
MCV RBC AUTO: 106 FL (ref 80–100)
PLATELET # BLD AUTO: 307 THOU/UL (ref 140–440)
PMV BLD AUTO: 10.2 FL (ref 8.5–12.5)
RBC # BLD AUTO: 3.54 MILL/UL (ref 4.4–6.2)
WBC: 11.3 THOU/UL (ref 4–11)

## 2018-04-17 LAB
ALBUMIN SERPL-MCNC: 3.5 G/DL (ref 3.5–5)
ALP SERPL-CCNC: 93 U/L (ref 45–120)
ALT SERPL W P-5'-P-CCNC: <9 U/L (ref 0–45)
AST SERPL W P-5'-P-CCNC: 25 U/L (ref 0–40)
BILIRUB DIRECT SERPL-MCNC: 0.3 MG/DL
BILIRUB SERPL-MCNC: 1 MG/DL (ref 0–1)
ERYTHROCYTE [DISTWIDTH] IN BLOOD BY AUTOMATED COUNT: 13.3 % (ref 11–14.5)
HCT VFR BLD AUTO: 36.7 % (ref 40–54)
HGB BLD-MCNC: 12.2 G/DL (ref 14–18)
MCH RBC QN AUTO: 35 PG (ref 27–34)
MCHC RBC AUTO-ENTMCNC: 33.2 G/DL (ref 32–36)
MCV RBC AUTO: 105 FL (ref 80–100)
PLATELET # BLD AUTO: 295 THOU/UL (ref 140–440)
PMV BLD AUTO: 10.5 FL (ref 8.5–12.5)
PROT SERPL-MCNC: 7 G/DL (ref 6–8)
RBC # BLD AUTO: 3.49 MILL/UL (ref 4.4–6.2)
WBC: 10.5 THOU/UL (ref 4–11)

## 2018-04-27 ENCOUNTER — RECORDS - HEALTHEAST (OUTPATIENT)
Dept: LAB | Facility: CLINIC | Age: 78
End: 2018-04-27

## 2018-04-30 LAB
ANION GAP SERPL CALCULATED.3IONS-SCNC: 9 MMOL/L (ref 5–18)
BUN SERPL-MCNC: 14 MG/DL (ref 8–28)
CALCIUM SERPL-MCNC: 8.8 MG/DL (ref 8.5–10.5)
CHLORIDE BLD-SCNC: 101 MMOL/L (ref 98–107)
CO2 SERPL-SCNC: 25 MMOL/L (ref 22–31)
CREAT SERPL-MCNC: 0.81 MG/DL (ref 0.7–1.3)
ERYTHROCYTE [DISTWIDTH] IN BLOOD BY AUTOMATED COUNT: 13.2 % (ref 11–14.5)
GFR SERPL CREATININE-BSD FRML MDRD: >60 ML/MIN/1.73M2
GLUCOSE BLD-MCNC: 76 MG/DL (ref 70–125)
HCT VFR BLD AUTO: 37.2 % (ref 40–54)
HGB BLD-MCNC: 12 G/DL (ref 14–18)
MCH RBC QN AUTO: 34 PG (ref 27–34)
MCHC RBC AUTO-ENTMCNC: 32.3 G/DL (ref 32–36)
MCV RBC AUTO: 105 FL (ref 80–100)
PLATELET # BLD AUTO: 217 THOU/UL (ref 140–440)
PMV BLD AUTO: 10.3 FL (ref 8.5–12.5)
POTASSIUM BLD-SCNC: 4.4 MMOL/L (ref 3.5–5)
RBC # BLD AUTO: 3.53 MILL/UL (ref 4.4–6.2)
SODIUM SERPL-SCNC: 135 MMOL/L (ref 136–145)
WBC: 5.6 THOU/UL (ref 4–11)

## 2018-05-04 ENCOUNTER — RECORDS - HEALTHEAST (OUTPATIENT)
Dept: LAB | Facility: CLINIC | Age: 78
End: 2018-05-04

## 2018-05-07 LAB
C REACTIVE PROTEIN LHE: 2.9 MG/DL (ref 0–0.8)
ERYTHROCYTE [DISTWIDTH] IN BLOOD BY AUTOMATED COUNT: 13.5 % (ref 11–14.5)
ERYTHROCYTE [SEDIMENTATION RATE] IN BLOOD BY WESTERGREN METHOD: 23 MM/HR (ref 0–15)
HCT VFR BLD AUTO: 41.1 % (ref 40–54)
HGB BLD-MCNC: 13.4 G/DL (ref 14–18)
MCH RBC QN AUTO: 33.8 PG (ref 27–34)
MCHC RBC AUTO-ENTMCNC: 32.6 G/DL (ref 32–36)
MCV RBC AUTO: 104 FL (ref 80–100)
PLATELET # BLD AUTO: 242 THOU/UL (ref 140–440)
PMV BLD AUTO: 9.9 FL (ref 8.5–12.5)
RBC # BLD AUTO: 3.97 MILL/UL (ref 4.4–6.2)
SODIUM SERPL-SCNC: 137 MMOL/L (ref 136–145)
WBC: 8.4 THOU/UL (ref 4–11)

## 2018-05-10 ENCOUNTER — RECORDS - HEALTHEAST (OUTPATIENT)
Dept: LAB | Facility: CLINIC | Age: 78
End: 2018-05-10

## 2018-05-11 LAB
25(OH)D3 SERPL-MCNC: 41.5 NG/ML (ref 30–80)
TSH SERPL DL<=0.005 MIU/L-ACNC: 1.49 UIU/ML (ref 0.3–5)

## 2018-05-14 ENCOUNTER — RECORDS - HEALTHEAST (OUTPATIENT)
Dept: LAB | Facility: CLINIC | Age: 78
End: 2018-05-14

## 2018-05-14 LAB
ALBUMIN UR-MCNC: NEGATIVE MG/DL
APPEARANCE UR: CLEAR
BACTERIA #/AREA URNS HPF: ABNORMAL HPF
BILIRUB UR QL STRIP: NEGATIVE
COLOR UR AUTO: YELLOW
GLUCOSE UR STRIP-MCNC: NEGATIVE MG/DL
HGB UR QL STRIP: NEGATIVE
HYALINE CASTS #/AREA URNS LPF: ABNORMAL LPF
KETONES UR STRIP-MCNC: NEGATIVE MG/DL
LEUKOCYTE ESTERASE UR QL STRIP: ABNORMAL
NITRATE UR QL: NEGATIVE
PH UR STRIP: 6.5 [PH] (ref 4.5–8)
RBC #/AREA URNS AUTO: ABNORMAL HPF
SP GR UR STRIP: 1.01 (ref 1–1.03)
SQUAMOUS #/AREA URNS AUTO: ABNORMAL LPF
UROBILINOGEN UR STRIP-ACNC: ABNORMAL
WBC #/AREA URNS AUTO: ABNORMAL HPF

## 2018-05-15 LAB
BACTERIA SPEC CULT: NO GROWTH
BASOPHILS # BLD AUTO: 0 THOU/UL (ref 0–0.2)
BASOPHILS NFR BLD AUTO: 0 % (ref 0–2)
C REACTIVE PROTEIN LHE: 1.6 MG/DL (ref 0–0.8)
EOSINOPHIL # BLD AUTO: 0.4 THOU/UL (ref 0–0.4)
EOSINOPHIL NFR BLD AUTO: 4 % (ref 0–6)
ERYTHROCYTE [DISTWIDTH] IN BLOOD BY AUTOMATED COUNT: 13.6 % (ref 11–14.5)
ERYTHROCYTE [SEDIMENTATION RATE] IN BLOOD BY WESTERGREN METHOD: 14 MM/HR (ref 0–15)
HCT VFR BLD AUTO: 39.6 % (ref 40–54)
HGB BLD-MCNC: 13.1 G/DL (ref 14–18)
LYMPHOCYTES # BLD AUTO: 1.5 THOU/UL (ref 0.8–4.4)
LYMPHOCYTES NFR BLD AUTO: 17 % (ref 20–40)
MCH RBC QN AUTO: 33.6 PG (ref 27–34)
MCHC RBC AUTO-ENTMCNC: 33.1 G/DL (ref 32–36)
MCV RBC AUTO: 102 FL (ref 80–100)
MONOCYTES # BLD AUTO: 0.8 THOU/UL (ref 0–0.9)
MONOCYTES NFR BLD AUTO: 9 % (ref 2–10)
NEUTROPHILS # BLD AUTO: 6.1 THOU/UL (ref 2–7.7)
NEUTROPHILS NFR BLD AUTO: 69 % (ref 50–70)
PLATELET # BLD AUTO: 285 THOU/UL (ref 140–440)
PMV BLD AUTO: 9.9 FL (ref 8.5–12.5)
RBC # BLD AUTO: 3.9 MILL/UL (ref 4.4–6.2)
WBC: 8.9 THOU/UL (ref 4–11)

## 2018-05-21 ENCOUNTER — RECORDS - HEALTHEAST (OUTPATIENT)
Dept: LAB | Facility: CLINIC | Age: 78
End: 2018-05-21

## 2018-05-22 LAB
ALBUMIN SERPL-MCNC: 3.7 G/DL (ref 3.5–5)
PREALB SERPL-MCNC: 22.2 MG/DL (ref 19–38)

## 2018-06-04 ENCOUNTER — RECORDS - HEALTHEAST (OUTPATIENT)
Dept: LAB | Facility: CLINIC | Age: 78
End: 2018-06-04

## 2018-06-05 LAB
ANION GAP SERPL CALCULATED.3IONS-SCNC: 8 MMOL/L (ref 5–18)
BUN SERPL-MCNC: 19 MG/DL (ref 8–28)
CALCIUM SERPL-MCNC: 9.4 MG/DL (ref 8.5–10.5)
CHLORIDE BLD-SCNC: 106 MMOL/L (ref 98–107)
CO2 SERPL-SCNC: 26 MMOL/L (ref 22–31)
CREAT SERPL-MCNC: 0.8 MG/DL (ref 0.7–1.3)
GFR SERPL CREATININE-BSD FRML MDRD: >60 ML/MIN/1.73M2
GLUCOSE BLD-MCNC: 87 MG/DL (ref 70–125)
MAGNESIUM SERPL-MCNC: 1.8 MG/DL (ref 1.8–2.6)
POTASSIUM BLD-SCNC: 4.1 MMOL/L (ref 3.5–5)
SODIUM SERPL-SCNC: 140 MMOL/L (ref 136–145)

## 2019-06-03 ENCOUNTER — MEDICAL CORRESPONDENCE (OUTPATIENT)
Dept: HEALTH INFORMATION MANAGEMENT | Facility: CLINIC | Age: 79
End: 2019-06-03

## 2020-11-17 ENCOUNTER — TRANSFERRED RECORDS (OUTPATIENT)
Dept: HEALTH INFORMATION MANAGEMENT | Facility: CLINIC | Age: 80
End: 2020-11-17

## 2020-11-18 ENCOUNTER — APPOINTMENT (OUTPATIENT)
Dept: CARDIOLOGY | Facility: CLINIC | Age: 80
DRG: 300 | End: 2020-11-18
Attending: STUDENT IN AN ORGANIZED HEALTH CARE EDUCATION/TRAINING PROGRAM
Payer: COMMERCIAL

## 2020-11-18 ENCOUNTER — HOSPITAL ENCOUNTER (INPATIENT)
Facility: CLINIC | Age: 80
LOS: 6 days | Discharge: HOSPICE | DRG: 300 | End: 2020-11-24
Attending: THORACIC SURGERY (CARDIOTHORACIC VASCULAR SURGERY) | Admitting: THORACIC SURGERY (CARDIOTHORACIC VASCULAR SURGERY)
Payer: COMMERCIAL

## 2020-11-18 DIAGNOSIS — I71.20 THORACIC AORTIC ANEURYSM WITHOUT RUPTURE (H): Primary | ICD-10-CM

## 2020-11-18 PROBLEM — I71.9 AORTA ANEURYSM (H): Status: ACTIVE | Noted: 2020-11-18

## 2020-11-18 LAB
ANION GAP SERPL CALCULATED.3IONS-SCNC: 7 MMOL/L (ref 3–14)
BUN SERPL-MCNC: 33 MG/DL (ref 7–30)
CA-I BLD-MCNC: 4.3 MG/DL (ref 4.4–5.2)
CALCIUM SERPL-MCNC: 7.8 MG/DL (ref 8.5–10.1)
CHLORIDE SERPL-SCNC: 110 MMOL/L (ref 94–109)
CK SERPL-CCNC: 36 U/L (ref 30–300)
CO2 SERPL-SCNC: 22 MMOL/L (ref 20–32)
CREAT SERPL-MCNC: 1.17 MG/DL (ref 0.66–1.25)
ERYTHROCYTE [DISTWIDTH] IN BLOOD BY AUTOMATED COUNT: 15.5 % (ref 10–15)
GFR SERPL CREATININE-BSD FRML MDRD: 58 ML/MIN/{1.73_M2}
GLUCOSE BLDC GLUCOMTR-MCNC: 101 MG/DL (ref 70–99)
GLUCOSE BLDC GLUCOMTR-MCNC: 103 MG/DL (ref 70–99)
GLUCOSE SERPL-MCNC: 128 MG/DL (ref 70–99)
HCT VFR BLD AUTO: 34.9 % (ref 40–53)
HGB BLD-MCNC: 10.8 G/DL (ref 13.3–17.7)
HGB BLD-MCNC: 11.2 G/DL (ref 13.3–17.7)
INTERPRETATION ECG - MUSE: NORMAL
LACTATE BLD-SCNC: 1.6 MMOL/L (ref 0.7–2)
MAGNESIUM SERPL-MCNC: 1.8 MG/DL (ref 1.6–2.3)
MCH RBC QN AUTO: 31.5 PG (ref 26.5–33)
MCHC RBC AUTO-ENTMCNC: 30.9 G/DL (ref 31.5–36.5)
MCV RBC AUTO: 102 FL (ref 78–100)
PHOSPHATE SERPL-MCNC: 2.9 MG/DL (ref 2.5–4.5)
PLATELET # BLD AUTO: 128 10E9/L (ref 150–450)
POTASSIUM SERPL-SCNC: 4.4 MMOL/L (ref 3.4–5.3)
RBC # BLD AUTO: 3.43 10E12/L (ref 4.4–5.9)
SODIUM SERPL-SCNC: 139 MMOL/L (ref 133–144)
TROPONIN I SERPL-MCNC: <0.015 UG/L (ref 0–0.04)
WBC # BLD AUTO: 13.1 10E9/L (ref 4–11)

## 2020-11-18 PROCEDURE — 84484 ASSAY OF TROPONIN QUANT: CPT | Performed by: STUDENT IN AN ORGANIZED HEALTH CARE EDUCATION/TRAINING PROGRAM

## 2020-11-18 PROCEDURE — 83735 ASSAY OF MAGNESIUM: CPT | Performed by: STUDENT IN AN ORGANIZED HEALTH CARE EDUCATION/TRAINING PROGRAM

## 2020-11-18 PROCEDURE — 250N000011 HC RX IP 250 OP 636: Performed by: STUDENT IN AN ORGANIZED HEALTH CARE EDUCATION/TRAINING PROGRAM

## 2020-11-18 PROCEDURE — 82550 ASSAY OF CK (CPK): CPT | Performed by: STUDENT IN AN ORGANIZED HEALTH CARE EDUCATION/TRAINING PROGRAM

## 2020-11-18 PROCEDURE — 93306 TTE W/DOPPLER COMPLETE: CPT | Mod: 26 | Performed by: INTERNAL MEDICINE

## 2020-11-18 PROCEDURE — 120N000002 HC R&B MED SURG/OB UMMC

## 2020-11-18 PROCEDURE — 250N000011 HC RX IP 250 OP 636: Performed by: INTERNAL MEDICINE

## 2020-11-18 PROCEDURE — 82330 ASSAY OF CALCIUM: CPT | Performed by: STUDENT IN AN ORGANIZED HEALTH CARE EDUCATION/TRAINING PROGRAM

## 2020-11-18 PROCEDURE — 999N000208 ECHOCARDIOGRAM COMPLETE

## 2020-11-18 PROCEDURE — 83605 ASSAY OF LACTIC ACID: CPT | Performed by: STUDENT IN AN ORGANIZED HEALTH CARE EDUCATION/TRAINING PROGRAM

## 2020-11-18 PROCEDURE — 85018 HEMOGLOBIN: CPT | Performed by: STUDENT IN AN ORGANIZED HEALTH CARE EDUCATION/TRAINING PROGRAM

## 2020-11-18 PROCEDURE — 999N001017 HC STATISTIC GLUCOSE BY METER IP

## 2020-11-18 PROCEDURE — 93005 ELECTROCARDIOGRAM TRACING: CPT

## 2020-11-18 PROCEDURE — 84100 ASSAY OF PHOSPHORUS: CPT | Performed by: STUDENT IN AN ORGANIZED HEALTH CARE EDUCATION/TRAINING PROGRAM

## 2020-11-18 PROCEDURE — 99223 1ST HOSP IP/OBS HIGH 75: CPT | Performed by: INTERNAL MEDICINE

## 2020-11-18 PROCEDURE — 80048 BASIC METABOLIC PNL TOTAL CA: CPT | Performed by: STUDENT IN AN ORGANIZED HEALTH CARE EDUCATION/TRAINING PROGRAM

## 2020-11-18 PROCEDURE — 255N000002 HC RX 255 OP 636: Performed by: INTERNAL MEDICINE

## 2020-11-18 PROCEDURE — 250N000011 HC RX IP 250 OP 636

## 2020-11-18 PROCEDURE — 250N000013 HC RX MED GY IP 250 OP 250 PS 637

## 2020-11-18 PROCEDURE — 999N000157 HC STATISTIC RCP TIME EA 10 MIN

## 2020-11-18 PROCEDURE — 93010 ELECTROCARDIOGRAM REPORT: CPT | Mod: 76 | Performed by: INTERNAL MEDICINE

## 2020-11-18 PROCEDURE — 999N000015 HC STATISTIC ARTERIAL MONITORING DAILY

## 2020-11-18 PROCEDURE — 250N000009 HC RX 250: Performed by: STUDENT IN AN ORGANIZED HEALTH CARE EDUCATION/TRAINING PROGRAM

## 2020-11-18 PROCEDURE — 85027 COMPLETE CBC AUTOMATED: CPT | Performed by: STUDENT IN AN ORGANIZED HEALTH CARE EDUCATION/TRAINING PROGRAM

## 2020-11-18 RX ORDER — SERTRALINE HYDROCHLORIDE 25 MG/1
25 TABLET, FILM COATED ORAL DAILY
Status: DISCONTINUED | OUTPATIENT
Start: 2020-11-18 | End: 2020-11-18

## 2020-11-18 RX ORDER — HYDROMORPHONE HCL IN WATER/PF 6 MG/30 ML
0.2 PATIENT CONTROLLED ANALGESIA SYRINGE INTRAVENOUS
Status: DISCONTINUED | OUTPATIENT
Start: 2020-11-18 | End: 2020-11-18

## 2020-11-18 RX ORDER — CARVEDILOL 12.5 MG/1
25 TABLET ORAL 2 TIMES DAILY WITH MEALS
Status: DISCONTINUED | OUTPATIENT
Start: 2020-11-18 | End: 2020-11-23

## 2020-11-18 RX ORDER — HYDROMORPHONE HYDROCHLORIDE 1 MG/ML
.5-1 INJECTION, SOLUTION INTRAMUSCULAR; INTRAVENOUS; SUBCUTANEOUS EVERY 10 MIN PRN
Status: DISCONTINUED | OUTPATIENT
Start: 2020-11-18 | End: 2020-11-19

## 2020-11-18 RX ORDER — DEXTROSE MONOHYDRATE 25 G/50ML
25-50 INJECTION, SOLUTION INTRAVENOUS
Status: DISCONTINUED | OUTPATIENT
Start: 2020-11-18 | End: 2020-11-24 | Stop reason: HOSPADM

## 2020-11-18 RX ORDER — NALOXONE HYDROCHLORIDE 0.4 MG/ML
0.4 INJECTION, SOLUTION INTRAMUSCULAR; INTRAVENOUS; SUBCUTANEOUS
Status: DISCONTINUED | OUTPATIENT
Start: 2020-11-18 | End: 2020-11-24 | Stop reason: HOSPADM

## 2020-11-18 RX ORDER — NALOXONE HYDROCHLORIDE 0.4 MG/ML
0.2 INJECTION, SOLUTION INTRAMUSCULAR; INTRAVENOUS; SUBCUTANEOUS
Status: DISCONTINUED | OUTPATIENT
Start: 2020-11-18 | End: 2020-11-24 | Stop reason: HOSPADM

## 2020-11-18 RX ORDER — LORAZEPAM 2 MG/ML
.5-1 INJECTION INTRAMUSCULAR
Status: DISCONTINUED | OUTPATIENT
Start: 2020-11-18 | End: 2020-11-18

## 2020-11-18 RX ORDER — NICOTINE POLACRILEX 4 MG
15-30 LOZENGE BUCCAL
Status: DISCONTINUED | OUTPATIENT
Start: 2020-11-18 | End: 2020-11-24 | Stop reason: HOSPADM

## 2020-11-18 RX ORDER — FENTANYL CITRATE 50 UG/ML
INJECTION, SOLUTION INTRAMUSCULAR; INTRAVENOUS
Status: COMPLETED
Start: 2020-11-18 | End: 2020-11-18

## 2020-11-18 RX ORDER — LORAZEPAM 2 MG/ML
1-2 INJECTION INTRAMUSCULAR
Status: DISCONTINUED | OUTPATIENT
Start: 2020-11-18 | End: 2020-11-20

## 2020-11-18 RX ORDER — HYDROMORPHONE HYDROCHLORIDE 1 MG/ML
.3-.5 INJECTION, SOLUTION INTRAMUSCULAR; INTRAVENOUS; SUBCUTANEOUS EVERY 10 MIN PRN
Status: DISCONTINUED | OUTPATIENT
Start: 2020-11-18 | End: 2020-11-18

## 2020-11-18 RX ORDER — ONDANSETRON 2 MG/ML
4 INJECTION INTRAMUSCULAR; INTRAVENOUS EVERY 6 HOURS PRN
Status: DISCONTINUED | OUTPATIENT
Start: 2020-11-18 | End: 2020-11-24 | Stop reason: HOSPADM

## 2020-11-18 RX ORDER — HYDROCODONE BITARTRATE AND ACETAMINOPHEN 5; 325 MG/1; MG/1
1-2 TABLET ORAL EVERY 4 HOURS PRN
Status: DISCONTINUED | OUTPATIENT
Start: 2020-11-18 | End: 2020-11-18

## 2020-11-18 RX ORDER — FENTANYL CITRATE 50 UG/ML
25-50 INJECTION, SOLUTION INTRAMUSCULAR; INTRAVENOUS
Status: DISCONTINUED | OUTPATIENT
Start: 2020-11-18 | End: 2020-11-18

## 2020-11-18 RX ORDER — ESMOLOL HYDROCHLORIDE 20 MG/ML
50-300 INJECTION, SOLUTION INTRAVENOUS CONTINUOUS
Status: DISCONTINUED | OUTPATIENT
Start: 2020-11-18 | End: 2020-11-18

## 2020-11-18 RX ORDER — HYDROMORPHONE HYDROCHLORIDE 1 MG/ML
INJECTION, SOLUTION INTRAMUSCULAR; INTRAVENOUS; SUBCUTANEOUS
Status: COMPLETED
Start: 2020-11-18 | End: 2020-11-18

## 2020-11-18 RX ORDER — ONDANSETRON 4 MG/1
4 TABLET, ORALLY DISINTEGRATING ORAL EVERY 6 HOURS PRN
Status: DISCONTINUED | OUTPATIENT
Start: 2020-11-18 | End: 2020-11-24 | Stop reason: HOSPADM

## 2020-11-18 RX ORDER — CARBIDOPA AND LEVODOPA 25; 100 MG/1; MG/1
2 TABLET ORAL 3 TIMES DAILY
Status: DISCONTINUED | OUTPATIENT
Start: 2020-11-18 | End: 2020-11-23

## 2020-11-18 RX ADMIN — LORAZEPAM 1 MG: 2 INJECTION INTRAMUSCULAR; INTRAVENOUS at 12:46

## 2020-11-18 RX ADMIN — HYDROMORPHONE HYDROCHLORIDE 0.5 MG: 1 INJECTION, SOLUTION INTRAMUSCULAR; INTRAVENOUS; SUBCUTANEOUS at 22:25

## 2020-11-18 RX ADMIN — NICARDIPINE HYDROCHLORIDE 5 MG/HR: 0.2 INJECTION, SOLUTION INTRAVENOUS at 06:30

## 2020-11-18 RX ADMIN — FENTANYL CITRATE 25 MCG: 50 INJECTION, SOLUTION INTRAMUSCULAR; INTRAVENOUS at 10:00

## 2020-11-18 RX ADMIN — HYDROMORPHONE HYDROCHLORIDE 0.2 MG: 0.2 INJECTION, SOLUTION INTRAMUSCULAR; INTRAVENOUS; SUBCUTANEOUS at 10:49

## 2020-11-18 RX ADMIN — CARBIDOPA AND LEVODOPA 2 TABLET: 25; 100 TABLET ORAL at 12:50

## 2020-11-18 RX ADMIN — HUMAN ALBUMIN MICROSPHERES AND PERFLUTREN 5 ML: 10; .22 INJECTION, SOLUTION INTRAVENOUS at 08:30

## 2020-11-18 RX ADMIN — HYDROMORPHONE HYDROCHLORIDE 0.5 MG: 1 INJECTION, SOLUTION INTRAMUSCULAR; INTRAVENOUS; SUBCUTANEOUS at 14:59

## 2020-11-18 RX ADMIN — HYDROMORPHONE HYDROCHLORIDE 0.5 MG: 1 INJECTION, SOLUTION INTRAMUSCULAR; INTRAVENOUS; SUBCUTANEOUS at 13:09

## 2020-11-18 RX ADMIN — HYDROMORPHONE HYDROCHLORIDE 0.2 MG: 1 INJECTION, SOLUTION INTRAMUSCULAR; INTRAVENOUS; SUBCUTANEOUS at 07:41

## 2020-11-18 RX ADMIN — CARBIDOPA AND LEVODOPA 2 TABLET: 25; 100 TABLET ORAL at 19:06

## 2020-11-18 RX ADMIN — HYDROMORPHONE HYDROCHLORIDE 0.5 MG: 1 INJECTION, SOLUTION INTRAMUSCULAR; INTRAVENOUS; SUBCUTANEOUS at 17:28

## 2020-11-18 RX ADMIN — LORAZEPAM 1 MG: 2 INJECTION INTRAMUSCULAR; INTRAVENOUS at 19:25

## 2020-11-18 RX ADMIN — LORAZEPAM 1 MG: 2 INJECTION INTRAMUSCULAR; INTRAVENOUS at 15:54

## 2020-11-18 RX ADMIN — HYDROMORPHONE HYDROCHLORIDE 0.5 MG: 1 INJECTION, SOLUTION INTRAMUSCULAR; INTRAVENOUS; SUBCUTANEOUS at 19:17

## 2020-11-18 RX ADMIN — HYDROMORPHONE HYDROCHLORIDE 0.3 MG: 1 INJECTION, SOLUTION INTRAMUSCULAR; INTRAVENOUS; SUBCUTANEOUS at 12:08

## 2020-11-18 ASSESSMENT — ACTIVITIES OF DAILY LIVING (ADL)
ADLS_ACUITY_SCORE: 27
ADLS_ACUITY_SCORE: 27
ADLS_ACUITY_SCORE: 19
ADLS_ACUITY_SCORE: 25

## 2020-11-18 NOTE — PLAN OF CARE
Admitted/transferred from: [facility]  Reason for admission/transfer: Higher acuity of care  2 RN skin assessment: To be done  Result of skin assessment and interventions/actions: To be done  Height, weight, drug calc weight: Not Done  Patient belongings (see Flowsheet)  MDRO education added to care planN/A  ?

## 2020-11-18 NOTE — PLAN OF CARE
Patient is confused and somewhat restless. He has been in an accelerated junctional heart rhythm/ atrial fib with slow ventricular response. His MAP has been 50-55. On room air, he has O2 sats in the high 80s/low 90s. We are transitioning to comfort care, and I will no longer monitor vital signs. I will treat pain/anxiety with prn meds per the Palliative care team. Pt's daughter is at the bedside and has spoken with the Palliative care team and ICU team to understand her father's situation and transition his goals to comfort care. Other family members will be arriving soon.

## 2020-11-18 NOTE — PROGRESS NOTES
Care Management Follow Up    Length of Stay (days): 0    Expected Discharge Date:       Concerns to be Addressed:       Patient plan of care discussed at interdisciplinary rounds: Yes    Anticipated Discharge Disposition:  comfort cares      Anticipated Discharge Services:  comfort cares   Anticipated Discharge DME:        Referrals Placed by CM/LEVI:    Private pay costs discussed: Not applicable    Additional Information:  LEVI met with Pt daughter Emma for support and discussion on comfort cares. Pt has been on comfort cares since today when he was admitted to the hospital from [facility]. The Pt is anticipated to pass here in the hospital. SW mentioned that home hospice is an option for families but acknowledged that it is highly likely that Pt will pass in the hospital with the limited time he has left. Pt daughter reported that the medical team has prepared her to get ready for Pt to pass in the hospital quickly as care has been withdrawn. LEVI will continue to follow for psychosocial support, resources and advocate on behalf of the patient.  LEVI will continue to follow for psychosocial support, resources and advocate on behalf of the patient.    LENY Hunt, CELI  ICU    [facility]  Phone: [phone]  Pager: [phone]  CELI Hunt

## 2020-11-18 NOTE — PROVIDER NOTIFICATION
Called CVTS to the bedside to clarify goals of care because patient's blood pressure and oxygen saturations are decreasing, and his level of consciousness is decreasing.

## 2020-11-18 NOTE — LETTER
Health Information Management Services               Recipient:  [facility]         Sender: Jenifer, weekend SW coverage pager [phone], weekday SW coverage Adina [phone]          Date: November 22, 2020  Patient Name:  Андрей Lawson  Routing Message:  Please assess for admission for hospice care[place], and also [place].           The documents accompanying this notice contain confidential information belonging to the sender.  This information is intended only for the use of the individual or entity named above.  The authorized recipient of this information is prohibited from disclosing this information to any other party and is required to destroy the information after its stated need has been fulfilled, unless otherwise required by state law.      If you are not the intended recipient, you are hereby notified that any disclosure, copy, distribution or action taken in reliance on the contents of these documents is strictly prohibited.  If you have received this document in error, please return it by fax to [phone] with a note on the cover sheet explaining why you are returning it (e.g. not your patient, not your provider, etc.).  If you need further assistance, please call [facility] Centralized Transcription at [phone].  Documents may also be returned by mail to [employer], , [facility] [address], [place], [place] [address].

## 2020-11-18 NOTE — PLAN OF CARE
Hold PT per chart review and discussion with interdisciplinary team.  Pt with active aortic dissection thus not medically appropriate. Both PT & OT holding.

## 2020-11-18 NOTE — LETTER
Health Information Management Services               Recipient:  [facility] Intake        Sender:  LENY Amor, LICSW. Phone: [phone]        Date: November 20, 2020  Patient Name:  Андрей Lawson  Routing Message:    Please see hospice referral for CN.         The documents accompanying this notice contain confidential information belonging to the sender.  This information is intended only for the use of the individual or entity named above.  The authorized recipient of this information is prohibited from disclosing this information to any other party and is required to destroy the information after its stated need has been fulfilled, unless otherwise required by state law.      If you are not the intended recipient, you are hereby notified that any disclosure, copy, distribution or action taken in reliance on the contents of these documents is strictly prohibited.  If you have received this document in error, please return it by fax to [phone] with a note on the cover sheet explaining why you are returning it (e.g. not your patient, not your provider, etc.).  If you need further assistance, please call [facility] Centralized Transcription at [phone].  Documents may also be returned by mail to [employer], , [facility] [address], [place], [place] [address].

## 2020-11-18 NOTE — PROCEDURES
A time-out was completed verifying correct patient, procedure, site, positioning, and special equipment if applicable. The patient s left wrist was prepped and draped in sterile fashion. 1% Lidocaine was used to anesthetize the area. An  Arrow arterial line was introduced into the radial artery. The catheter was threaded over the guide wire and the needle was removed with appropriate pulsatile blood return. The catheter was then sutured in place to the skin and a sterile dressing applied. Estimated Blood Loss: 5cc  The patient tolerated the procedure well and there were no complications.    Josiah Smith MD  [facility]

## 2020-11-18 NOTE — LETTER
Transition Communication Hand-off for Care Transitions to Next Level of Care Provider    Name: Андрей FARZANEH Renny  : 1940  MRN #: [id number]  Primary Care Provider: Josef Rodriguez     Primary Clinic: [place] [id number]  [place] [address]     Reason for Hospitalization:  AAA  Aorta aneurysm (H)  Admit Date/Time: 2020  5:03 AM  Discharge Date: 2020  Payor Source: Payor: BLUE PLUS / Plan: BLUE PLUS ADVANTAGE DUAL MSHO / Product Type: Medicare /            Reason for Communication Hand-off Referral: Other D/c on hospice    Discharge Plan:  Home with [facility]     Concern for non-adherence with plan of care:   Y/N No  Discharge Needs Assessment:  Needs      Most Recent Value   Equipment Currently Used at Home  wheelchair, manual        Follow-up specialty is recommended: No    Follow-up plan:  No future appointments.    Any outstanding tests or procedures:        Referrals     Future Labs/Procedures    HOSPICE REFERRAL     Comments:    **Order classes of: [facility], [facility] and [facility] will route to the Home Care and Hospice Referral Pool.  Home Care or Hospice will then contact the patient to schedule their appointment.**    Hospice eligibility overview: prognosis of 6 months or less, end stage disease, patient goals are comfort only, patient is not seeking curative treatment.    If you do not hear from Hospice, or you would like to call to schedule, please call the referring place of service that your provider has listed below.  ______________________________________________________________________    Your provider has referred you to: PREFERRED PROVIDERS: [facility]     Anticipated discharge date 20. Hospice to begin within 48 hours of discharge.  PLEASE Schedule Hospice consult for 24 - 48 hours.  Please call if there is need for a variance to this timeline.    REASON FOR REFERRAL: Hospice Diagnosis: Type A aortic dissection     ADDITIONAL SERVICES  NEEDED: none    OTHER PERTINENT INFORMATION: Patient was last seen by provider on 11/24/20 for inpatient discharge to home hospice.  Factors that indicate prognosis of less than 6 months:  Weight loss: yes  Functional decline: yes    Current Outpatient Medications:       acetaminophen (TYLENOL) 32 mg/mL liquid, Take 20.3 mLs (650 mg) by mouth 3 times daily, Disp: 473 mL, Rfl: 3       artificial saliva (BIOTENE DRY MOUTHWASH) LIQD liquid, Swish and spit 15 mLs in mouth 4 times daily Can apply with mouth swabs, Disp: 1 Bottle, Rfl: 1       baclofen (FIRST-BACLOFEN) 5 MG/ML SUSP, Take 2 mLs (10 mg) by mouth 3 times daily, Disp: 120 mL, Rfl: 1       carbidopa-levodopa (PARCOPA)  MG ODT, Take 2 tablets by mouth 3 times daily, Disp: 120 tablet, Rfl: 1       docusate (COLACE) 50 MG/5ML liquid, Take 10 mLs (100 mg) by mouth 2 times daily, Disp: 473 mL, Rfl: 0       LORazepam (ATIVAN) 2 MG/ML (HIGH CONC) solution, Take 0.5 mLs (1 mg) by mouth every 6 hours as needed for agitation or anxiety, Disp: 30 mL, Rfl: 0      Patient Active Problem List:     Aorta aneurysm (H)    This patient is under my care, and I have initiated the establishment of the plan of care. This patient will be followed by a physician who will periodically review the plan of care.    Physician/Provider to provide follow up care: Josef Rodriguez    [facility] certified Physician at time of discharge: Jaxson Sanchez PA-C    Please be aware that coverage of these services is subject to the terms and limitations of your health insurance plan.  Call member services at your health plan with any benefit or coverage questions.              LENY Amor, [address]   [phone] (pager) [phone]  11/24/2020

## 2020-11-18 NOTE — CONSULTS
[facility]  Palliative Care Consultation Note    Patient: Андрей Lawson  Date of Admission:  11/18/2020    Requesting Clinician / Team: ICU team  Reason for consult: Pain management  Symptom management  Goals of care  Patient and family support    Recommendations:    Transition to comfort cares. See below for details of goals of care conversation    DNR / DNI    Family coming to hospital for end of life.      support requested by daughter     Pain management: leg pain (chronic) and chest back pain due to aortic dissection.     Hydromorphone 0.3-0.5mg IV q10 min PRN for now      These recommendations have been discussed with ICU team.      Thank you for the opportunity to participate in the care of this patient and family. Our team: will continue to follow.     During regular M-F work hours -- if you are not sure who specifically to contact -- please contact us by sending a text page to our team consult pager at [phone].    After regular work hours and on weekends/holidays, you can call our answering service at [phone]. Also, who's on call for us is available in [facility] [employer].       Assessments:  Андрей Lawson is a 79 year old male with a past medical history significant for Parkinsons dz, episode severe delirium and hallucinations in 2/2020 and significant functional decline over past months, admitted from OSH with type A aortic dissection but determined not to be a good surgical candidate to undergo this type of aortic dissection and patient transitioned to comfort cares.     Today, the patient was seen for:  Parkinsons dz  Hallucinations  Aortic dissection  Chest and back pain due to above  Chronic foot pain  Goals of care  Family support    Prognosis, Goals, & Planning:      Functional Status just prior to hospitalization: 3 (Capable of only limited self-care; needs help with ADLs; in bed/chair >50% of waking hours)      Prognosis, Goals,  and/or Advance Care Planning were addressed today: Yes   I met with patient and his daughter, Emma, today.  Андрей was not able to participate in our conversation as he was too restless and having episodes of hallucinations. Emma told me that her father always valued quality of life and had to experience his wife, Emma's mother, go through a very tough process before dying and he always expressed how he would not want that for himself. Emma told me that the year or so has been very difficult for her dad and his quality of life has not been good and she does not believe that he would want any interventions or surgery in this case, knowing it would not improve his quality of life and he would likely not even survive the surgery. She feels he would want to focus on his comfort now at the end of his life and see his family before dying. I gave a prognosis of hours to days and possibly longer.       Patient's decision making preferences: unable to assess          Patient has decision-making capacity today for complex decisions: No            I have concerns about the patient/family's health literacy today: No           Patient has a completed Health Care Directive: No.       Code status: No CPR / No Intubation    Coping, Meaning, & Spirituality:   Mood, coping, and/or meaning in the context of serious illness were addressed today: Yes  Caodaism very important to patient and his daughter asked for communion. He has not been able to get to Yarsani due to debility and covid. Daughter, Emma, states that for patient, his quality of life much more important that quantity and he has lived a good life but for past months or longer, life has been very hard (wife  2019).     Social:     Living situation: lives with his adult son who has autism; PCA in the house for both patient and son?     History of Present Illness:  History gathered today from: family/loved ones, medical chart, medical team members    Андрей Lawson is  a 79 year old male with a past medical history significant for Parkinsons dz, episode severe delirium and hallucinations in 2/2020 and significant functional decline over past months, admitted from OSH with type A aortic dissection but determined not to be a good surgical candidate to undergo this type of aortic dissection and patient transitioned to comfort cares.     Key Palliative Symptom Data:  Pain location: leg pain and back / chest pain  # Pain severity the last 12 hours: moderate  # Dyspnea severity the last 12 hours: none  # Nausea severity the last 12 hours: none    Patient is on opioids: bowels not assessed today.    ROS:  Besides above, a complete 10+ ROS was reviewed and is unremarkable      Past Medical History:  Past Medical History:   Diagnosis Date     Arthritis     spinal stenosis     Hypertension     on heart meds but unsure why     Parkinsons disease (H)         Past Surgical History:  Past Surgical History:   Procedure Laterality Date     HYDROCELECTOMY SCROTAL Left 9/13/2017    Procedure: HYDROCELECTOMY SCROTAL;  Left hydrocele repair with drain placement;  Surgeon: Bola Warner MD;  Location: [facility] OR     ORTHOPEDIC SURGERY Bilateral     hip replacement         Family History:  No family history on file.      Allergies:  No Known Allergies     Medications:  I have reviewed this patient's medication profile and medications from this hospitalization.     Physical Exam:  Vital Signs: Temp: 98.3  F (36.8  C) Temp src: Axillary BP: (!) 119/99 Pulse: 61   Resp: 25 SpO2: 95 % O2 Device: Oxymask Oxygen Delivery: 3 LPM  Weight: 0 lbs 0 oz  Gen: alert, lying in bed, appears stated age, restless, intermittently in pain, in no respiratory distress  Eyes: Conjunctiva clear. Sclera anicteric .  HENT: NCAT; no temporal wasting, mucous membranes moist  Resp: no increased work of breathing  Abd: soft, nt, nd, +BS   Msk: no gross deformity, no sarcopenia  Skin:  no jaundice  Ext: warm, well perfused.    Mental status/Psych: able to answer yes no symptom questions and at times can answer more complex questions (who he lives with) but other times with visual / auditory hallucinations; sensorium not intact    Data reviewed:  Reviewed recent labs and pertinent imaging      Thank you for the opportunity to continue to participate in the care of this patient and family.  Please feel free to contact on-call palliative provider with any emergent needs.  We can be reached via team pager [phone] (answered 8-4:30 Monday-Friday); after-hours answering service ([phone]);     Faye Cool MD / Palliative Medicine / Pager [phone] / [facility] Inpatient Team Consult Pager [phone] (answered 8am-430pm M-F) - ok to text page via [employer] / After-Hours Answering Service [phone] / Palliative Clinic in the [facility] at the [facility] - [phone] (scheduling); [phone] (triage).  Total time spent was 85 minutes,  >50% of time was spent counseling and/or coordination of care regarding pain and symptom management, goals of care, plan of care.

## 2020-11-18 NOTE — LETTER
Health Information Management Services               Recipient:  [facility]         Sender: Jenifer weekend  coverage [phone], weekday  coverage Adina [phone]          Date: November 22, 2020  Patient Name:  Андрей Lawson  Routing Message:  Please assess for admission, hospice care. [place], and also [place].           The documents accompanying this notice contain confidential information belonging to the sender.  This information is intended only for the use of the individual or entity named above.  The authorized recipient of this information is prohibited from disclosing this information to any other party and is required to destroy the information after its stated need has been fulfilled, unless otherwise required by state law.      If you are not the intended recipient, you are hereby notified that any disclosure, copy, distribution or action taken in reliance on the contents of these documents is strictly prohibited.  If you have received this document in error, please return it by fax to [phone] with a note on the cover sheet explaining why you are returning it (e.g. not your patient, not your provider, etc.).  If you need further assistance, please call [facility] Centralized Transcription at [phone].  Documents may also be returned by mail to [employer], , [address], [place], [place] [address].

## 2020-11-18 NOTE — PROGRESS NOTES
PALLIATIVE CARE SOCIAL WORK Progress Note   [facility] ([place] Unit 4E    REFERRAL SOURCE: Palliative Care Team    PCSW was asked to visit with daughter (Emma) for support as Андрей's plan of care has changed to a comfort focus.     Brief visit with Emma. She was having difficulty with making decisions on who should be able to visit with Андрей. PCSW explained the visitor policy of needing to pass the covid questions & checking in with security. Also only two visitors at a time, up to 6 per day. Once family visits they need to remain in the room and not come back and forth. At the end of our visit she listed 3 visitors for sure (her , Ariel Botello; Андрей's son, Ricky Lawson; and Андрей's PCA, Inge Jane). There is a small chance that Emma's son will also want to visit. [facility]     Plan: PCSW will continue to be available for support while Palliative Care Team is following.     Bonnie Magaña [facility]  Palliative Care   Pager [phone]    [facility] Inpatient Team Consult pager [phone] (M-F 8-4:30)  After-hours Answering Service [phone]

## 2020-11-18 NOTE — PROGRESS NOTES
"SPIRITUAL HEALTH SERVICES  SPIRITUAL ASSESSMENT Progress Note  [facility] ([place]) 4E   ON-CALL VISIT    REFERRAL SOURCE: Pt request for communion.     I met with Андрей and his daughter Emma and introduced them to SHS role and availability. Emma shared that they're Confucianist and Андрей has not been able to attend [employer] due to his Parkinson's and \"he brings up feeling bad that he hasn't had communion\". Андрей shared of the parties he and his wife used to throw for their neighbors, their love of music, and a memorable polka communion. I facilitated communion and let them know of continued SHS support.    PLAN: Will refer to unit/palliative .     Lauren Lees  Chaplain Resident  Pager: [phone]    "

## 2020-11-18 NOTE — PLAN OF CARE
Patient was seen and examined by me.  I spoke at length with the patient's daughter.  He has had Parkinson's for about 5 years.  He had a bad episode of delirium and hallucinations in February of this year.   He has been suffering for a while and his daughter does not think he would want to go through a big operation.  He would not be a good candidate to undergo repair of his type A aortic dissection.  Therefore we plan to manage his type A dissection medically.  He will need Neurology and Palliative Care to see him today.  Daughter will inform the rest of the family of this plan.

## 2020-11-18 NOTE — H&P
CV ICU H&P  11/18/2020      CO-MORBIDITIES:   Patient Active Problem List   Diagnosis     Aorta aneurysm (H)       ASSESSMENT: Андрей Lawson is a 79 year old male with PMH of Parkinsons admitted from OSH with type A aortic dissection. On arrival patient was bradycardic in the 40s and irregular with SBP on the right arm in the 180s and SBPs on the left of 70's. Per EMS patient had been reciving Esmolol and labetalol for BP control. In discussion with patients daughter she confirmed that her father would not want heroic measures or to be intubated base on poor prognosis. Patient endorses chest and back pain. Denies SOB    PLAN:  Neuro/ pain/ sedation:  # Acute post-operative pain  - Monitor neurological status. Notify the MD for any acute changes in exam.  - dilaudid for pain.    Pulmonary care:   - MV  - Titrate FiO2 for SpO2 >92%    Cardiovascular:    Nicardipine gtt for BP control, SBP <120    GI /Nutrition:   - NPO except ice chips and medications.    Fluids/ Electrolytes/ Renal:   -strict I&Os      Endocrine:    Monitor BG    ID/ Antibiotics:  No antibiotics  COVID 19 negative    Heme:     Serial Hgb    Prophylaxis:    - SCDs      Lines/ tubes/ drains:  - Arterial line  -PIV    Disposition:  - CV ICU.     Patient seen, findings and plan discussed with CV ICU Fellow Dr Hutton        ====================================        PAST MEDICAL HISTORY:   Past Medical History:   Diagnosis Date     Arthritis     spinal stenosis     Hypertension     on heart meds but unsure why     Parkinsons disease (H)        PAST SURGICAL HISTORY:   Past Surgical History:   Procedure Laterality Date     HYDROCELECTOMY SCROTAL Left 9/13/2017    Procedure: HYDROCELECTOMY SCROTAL;  Left hydrocele repair with drain placement;  Surgeon: Bola Warner MD;  Location: [facility] OR     ORTHOPEDIC SURGERY Bilateral     hip replacement       FAMILY HISTORY: No family history on file.    SOCIAL HISTORY:   Social History     Tobacco Use      "Smoking status: Never Smoker     Smokeless tobacco: Never Used   Substance Use Topics     Alcohol use: No     Comment: 1-3 per night         OBJECTIVE:   1. VITAL SIGNS:    There were no vitals taken for this visit.    Vital signs:                         Estimated body mass index is 31.57 kg/m  as calculated from the following:    Height as of 8/23/17: 1.778 m (5' 10\").    Weight as of 8/23/17: 99.8 kg (220 lb).         3. PHYSICAL EXAMINATION:   General:in bed, appears uncomfortable  Neuro: A&Ox2, in pain  Resp: Breathing non-labored  CV: irregular irregular  Abdomen: Soft, Non-distended, Non-tender  Extremities: warm and well perfused        5. RADIOLOGY:   CT chest  1.  Examination positive for Best type A aortic dissection. This extends from the aortic valve to the left subclavian artery. Short segment extension into the left subclavian. Great vessels patent.  2.  Small pericardial effusion.  3.  Focal ulceration of the proximal descending thoracic aorta.  4.  Dilatation of the aortic root at 5.6 cm. Sizable false lumen.  5.  No acute findings associated with the abdominal aorta.  6.  Atelectasis.  7.  Hiatal hernia.  8.  Diverticulosis.  9.  Left inguinal hernia containing fat.    =========================================          "

## 2020-11-18 NOTE — LETTER
Health Information Management Services               Recipient:  [facility]        Sender:  LENY Amor, LICSW. Phone: [phone]        Date: November 24, 2020  Patient Name:  Андрей Lawson  Routing Message:    Please see discharge orders for CN.         The documents accompanying this notice contain confidential information belonging to the sender.  This information is intended only for the use of the individual or entity named above.  The authorized recipient of this information is prohibited from disclosing this information to any other party and is required to destroy the information after its stated need has been fulfilled, unless otherwise required by state law.      If you are not the intended recipient, you are hereby notified that any disclosure, copy, distribution or action taken in reliance on the contents of these documents is strictly prohibited.  If you have received this document in error, please return it by fax to [phone] with a note on the cover sheet explaining why you are returning it (e.g. not your patient, not your provider, etc.).  If you need further assistance, please call [facility] Centralized Transcription at [phone].  Documents may also be returned by mail to [employer], , [facility] [address] [place], [place] [address].

## 2020-11-19 LAB — INTERPRETATION ECG - MUSE: NORMAL

## 2020-11-19 PROCEDURE — 120N000002 HC R&B MED SURG/OB UMMC

## 2020-11-19 PROCEDURE — 250N000011 HC RX IP 250 OP 636: Performed by: INTERNAL MEDICINE

## 2020-11-19 PROCEDURE — 99233 SBSQ HOSP IP/OBS HIGH 50: CPT | Performed by: INTERNAL MEDICINE

## 2020-11-19 PROCEDURE — 250N000013 HC RX MED GY IP 250 OP 250 PS 637

## 2020-11-19 RX ORDER — HYDROMORPHONE HYDROCHLORIDE 10 MG/ML
2-4 INJECTION INTRAMUSCULAR; INTRAVENOUS; SUBCUTANEOUS
Status: DISCONTINUED | OUTPATIENT
Start: 2020-11-19 | End: 2020-11-24 | Stop reason: HOSPADM

## 2020-11-19 RX ORDER — HYDROMORPHONE HYDROCHLORIDE 1 MG/ML
1-2 INJECTION, SOLUTION INTRAMUSCULAR; INTRAVENOUS; SUBCUTANEOUS EVERY 10 MIN PRN
Status: DISCONTINUED | OUTPATIENT
Start: 2020-11-19 | End: 2020-11-20

## 2020-11-19 RX ORDER — LORAZEPAM 2 MG/ML
1-2 CONCENTRATE ORAL
Status: DISCONTINUED | OUTPATIENT
Start: 2020-11-19 | End: 2020-11-24 | Stop reason: HOSPADM

## 2020-11-19 RX ADMIN — CARBIDOPA AND LEVODOPA 2 TABLET: 25; 100 TABLET ORAL at 09:01

## 2020-11-19 RX ADMIN — LORAZEPAM 1 MG: 2 INJECTION INTRAMUSCULAR; INTRAVENOUS at 12:57

## 2020-11-19 RX ADMIN — HYDROMORPHONE HYDROCHLORIDE 1 MG: 1 INJECTION, SOLUTION INTRAMUSCULAR; INTRAVENOUS; SUBCUTANEOUS at 04:03

## 2020-11-19 RX ADMIN — LORAZEPAM 1 MG: 2 INJECTION INTRAMUSCULAR; INTRAVENOUS at 00:56

## 2020-11-19 RX ADMIN — CARBIDOPA AND LEVODOPA 2 TABLET: 25; 100 TABLET ORAL at 19:24

## 2020-11-19 RX ADMIN — CARBIDOPA AND LEVODOPA 2 TABLET: 25; 100 TABLET ORAL at 12:57

## 2020-11-19 ASSESSMENT — ACTIVITIES OF DAILY LIVING (ADL)
ADLS_ACUITY_SCORE: 25
ADLS_ACUITY_SCORE: 26
ADLS_ACUITY_SCORE: 25
ADLS_ACUITY_SCORE: 25
ADLS_ACUITY_SCORE: 26
ADLS_ACUITY_SCORE: 26

## 2020-11-19 NOTE — PLAN OF CARE
Vitals:    11/18/20 0845 11/18/20 0900 11/18/20 0915 11/19/20 1201   BP: (!) 65/36 (!) 70/41 (!) 119/99 105/47   BP Location:    Right arm   Pulse: 59 64 61 (!) 44   Resp:  26 25    Temp:       TempSrc:       SpO2: 97% 90% 95% 93%   VSS - was done x 1 per daughter's request.     Neuro: Alert and oriented to self.     GI/: Incontinent of B & B. No BM this shift. Incontinent of urine x2.     Diet: Regular diet. Ate less than 25%. Daughter at bed side and helping with po intake.     Incisions/Drains: None.     IV Access: PIV x 2 - sl.    Labs: None this shift.     Activity: Turn and reposition as needed for comfort. Pt is on comfort cares.     Pain: No signes of pain noted. Pt denies pain. More active early afternoon and daughter requested ativan be given. See MAR. Has been able to take his sinemet crushed with pudding.     New changes this shift: More awake per pt. [facility] called saying that pt has [facility] services open and they have been providing nursing and nursing aide services. Both SW and CC for 7B updated.     Plan: Assist with position changes as need. Continue with comfort cares.

## 2020-11-19 NOTE — PROGRESS NOTES
[facility]  Palliative Care Daily Progress Note       Recommendations & Counseling       Goals - comfort. Initially anticipated death in the hospital but now pt has stabilized and therefore recommend planning discharge with hospice. I discussed this at length with pt's daughter, Emma and will also connect with .     DNR/DNI    Pain management - chronic leg pain and chest and back pain due to aortic dissection.     Hydromorphone 2-4mg high concentrate SL q3 hrs PRN.    Agitation / restlessness: lorazepam 1-2mg oral solution    Dyspnea hydromorphone 2-4mg high concentrate solution SL q3hrs PRN         Assessments          Андрейsly Lawson is a 79 year old male with a past medical history significant for Parkinsons dz, episode severe delirium and hallucinations in 2/2020 and significant functional decline over past months, admitted from OSH with type A aortic dissection but determined not to be a good surgical candidate to undergo this type of aortic dissection and patient transitioned to comfort cares.     Today, the patient was seen for:  Parkinsons dz  Hallucinations / restlessness  Aortic dissection  Chest and back pain due to above  Chronic foot pain  Goals of care  Family support    Prognosis, Goals, or Advance Care Planning was addressed today with: Yes. I spoke with patient's daughter, Emma, and again reiterated that he seems stable and could live for days or the aortic dissection could suddenly worsen and he could die within hours. I told her that  would speak with her about hospice discharge options.     Mood, coping, and/or meaning in the context of serious illness were addressed today: No.              Interval History:     Chart review/discussion with unit or clinical team members:   Pain and agitation well managed    Per patient or family/caregivers today:  Emma feels pt is comfortable and well cared for.     Key Palliative  Symptoms:   Pain location: leg and back  # Pain severity the last 12 hours: low  # Dyspnea severity the last 12 hours: none    Patient is on opioids: assessed and bowels ok/no needed changes to plan of care today.           Review of Systems:     Besides above, a complete 10+ ROS was reviewed and is unremarkable           Medications:     I have reviewed this patient's medication profile and medications during this hospitalization.    Noted meds:    Hydromorphone 0.5-1mg IV q10 min PRN - 4mg IV x 24 hrs  Lorazepam 1-2mg IV q3 hrs PRN - 4mg IV x 24 hrs           Physical Exam:   Vitals were reviewed      BP: 105/47 Pulse: (!) 44     SpO2: 93 % O2 Device: None (Room air)    Gen: lying in bed, appears frail and older than stated age, in NAD  Eyes: Conjunctiva clear. Sclera anicteric .  HENT: NCAT; mucous membranes slightly dry  Resp: no increased work of breathing,   Skin:  no jaundice  Neuro / neuropsych: oriented to self only, mumbling and not able to understand word most of the time but occasionally will articulate and can understand, sensorium not intact             Data Reviewed:     Reviewed recent labs and pertinent imaging        Thank you for the opportunity to continue to participate in the care of this patient and family.  Please feel free to contact on-call palliative provider with any emergent needs.  We can be reached via team pager [phone] (answered 8-4:30 Monday-Friday); after-hours answering service ([phone]);     Faye Cool MD / Palliative Medicine / Pager [phone] / [facility] Inpatient Team Consult Pager [phone] (answered 8am-430pm M-F) - ok to text page via [employer] / After-Hours Answering Service [phone] / Palliative Clinic in the [facility] at the [facility] - [phone] (scheduling); [phone] (triage).  Total time spent was 40 minutes,  >50% of time was spent counseling and/or coordination of care regarding symptom management, plan of care and goals of  care.

## 2020-11-19 NOTE — PLAN OF CARE
Patient on comfort cares. No vitals. No glucose checks. Patient daughter declined full skin check, stated pt has wound on L heel and LLE (calf). Patient given Sinemet with difficulty; patient unable to swallow at times. Patient given IV dilaudid x2 & IV ativan x1 for comfort. Patient incontinent to bowel and bladder; brief in place; clean and dry as of this note. Daughter Emma at bedside.

## 2020-11-19 NOTE — PLAN OF CARE
BP (!) 119/99   Pulse 61   Temp 98.3  F (36.8  C) (Axillary)   Resp 25   SpO2 95%     Pt on comfort cares, daughter at the bedside. Pt disoriented to time, place, and situation for most of shift, but orientation does fluctuate. Opens eyes and responds verbally some of the time. If verbally responsive, speech is very garbled. Sensation intact, pain managed with PRN dilaudid, restlessness with PRN ativan. L & R PIV SL. Urinary incontinent x1 this shift, perineal care provided. Pt repositioned throughout shift for comfort. Continue to monitor.

## 2020-11-19 NOTE — UTILIZATION REVIEW
"Admission Status; Secondary Review Determination     Under the authority of the Utilization Management Committee, the utilization review process indicated a secondary review on the above patient.  The review outcome is based on review of the medical records, discussions with staff, and applying clinical experience noted on the date of the review.       (x) Observation Status Appropriate - This patient does not meet hospital inpatient criteria and is placed in observation status. If this patient's primary payer is Medicare and was admitted as an inpatient, Condition Code 44 should be used and patient status changed to \"observation\".     RATIONALE FOR DETERMINATION: 79-year-old male with significant history of Parkinson's  admitted early morning on 11/18/2020 for symptomatic type a aortic dissection.  After reviewing surgical options with family members, it was decided to manage patient medically and comfort care status was decided within 4 hours of admission.  The intensity of treatment is appropriate for observation services to ensure optimal comfort care measures. Reviewed with Dr. Bradley.     The severity of illness, intensity of service provided, expected LOS and risk for adverse outcome make the care appropriate for further observation; however, doesn't meet criteria for hospital inpatient admission. This was discussed with attending physician who concurred with this determination.    The information on this document is developed by the utilization review team in order for the business office to ensure compliance.  This only denotes the appropriateness of proper admission status and does not reflect the quality of care rendered.         The definitions of Inpatient Status and Observation Status used in making the determination above are those provided in the CMS Coverage Manual, Chapter 1 and Chapter 6, section 70.4.      Sincerely,     Johnny Barba MD    Physician Advisor  Utilization Review/ Case " Management  [facility].

## 2020-11-19 NOTE — CONSULTS
Care Management Follow Up    Length of Stay (days): 1    Expected Discharge Date: 11/20/20     Concerns to be Addressed: discharge planning     Patient plan of care discussed at interdisciplinary rounds: No, received update from RN Manager Gaby     Anticipated Discharge Disposition: Hospice     Anticipated Discharge Services:  Hospice  Anticipated Discharge DME:  TBD    Patient/family educated on Medicare website which has current facility and service quality ratings:  Not at this time  Education Provided on the Discharge Plan:  Unable to at this time  Patient/Family in Agreement with the Plan:  Unknown    Referrals Placed by CM/SW:  None at this time  Private pay costs discussed: Unable to discuss at this time    Additional Information:  Pt is an 79 year old male who transferred from an OSH with a type A aortic dissection.   Pt was deemed to not be a surgical candidate and has been expected to pass in the hospital.     LEVI was informed by Nurse Manager Gaby that per Palliative Care, a discharge on hospice should be pursued as pt's death may not be imminent.     LEVI contacted pt's daughter Emma ([phone]) to discuss discharge planning after getting update that a discharge on hospice should be pursued as pt may not imminently pass in the hospital. Emma answered but said she was outside with her  and son and needed to talk to them quick and asked that SW call back in a few minutes.   LEVI returned call to Emma and phone went to [email or web address] but the mailbox was full so no message could be left.     LENY Amor, LICSW     [phone] (pager) [phone]  11/19/2020

## 2020-11-19 NOTE — PLAN OF CARE
Admitted/transferred from: 4E    Patient's family refused skin check; patient on comfort cares.     Will continue to monitor.

## 2020-11-20 PROCEDURE — 99233 SBSQ HOSP IP/OBS HIGH 50: CPT | Performed by: INTERNAL MEDICINE

## 2020-11-20 PROCEDURE — 250N000013 HC RX MED GY IP 250 OP 250 PS 637

## 2020-11-20 PROCEDURE — 250N000013 HC RX MED GY IP 250 OP 250 PS 637: Performed by: INTERNAL MEDICINE

## 2020-11-20 PROCEDURE — 120N000002 HC R&B MED SURG/OB UMMC

## 2020-11-20 PROCEDURE — 999N000147 HC STATISTIC PT IP EVAL DEFER

## 2020-11-20 RX ADMIN — CARBIDOPA AND LEVODOPA 2 TABLET: 25; 100 TABLET ORAL at 13:49

## 2020-11-20 RX ADMIN — Medication 12.5 MG: at 12:11

## 2020-11-20 RX ADMIN — CARBIDOPA AND LEVODOPA 2 TABLET: 25; 100 TABLET ORAL at 07:51

## 2020-11-20 RX ADMIN — Medication 12.5 MG: at 20:05

## 2020-11-20 RX ADMIN — Medication 12.5 MG: at 13:49

## 2020-11-20 RX ADMIN — CARBIDOPA AND LEVODOPA 2 TABLET: 25; 100 TABLET ORAL at 20:04

## 2020-11-20 RX ADMIN — LORAZEPAM 2 MG: 2 SOLUTION, CONCENTRATE ORAL at 21:25

## 2020-11-20 ASSESSMENT — ACTIVITIES OF DAILY LIVING (ADL)
ADLS_ACUITY_SCORE: 24
ADLS_ACUITY_SCORE: 24
ADLS_ACUITY_SCORE: 26
ADLS_ACUITY_SCORE: 26
ADLS_ACUITY_SCORE: 24
ADLS_ACUITY_SCORE: 24

## 2020-11-20 NOTE — PROGRESS NOTES
"Care Management Follow Up    Length of Stay (days): 2    Expected Discharge Date: 11/20/20     Concerns to be Addressed: discharge planning     Patient plan of care discussed at interdisciplinary rounds: Yes    Anticipated Discharge Disposition: Hospice     Anticipated Discharge Services:  Hospice  Anticipated Discharge DME:  TBD    Patient/family educated on Medicare website which has current facility and service quality ratings:  Yes  Education Provided on the Discharge Plan:  Yes  Patient/Family in Agreement with the Plan:  Pt's daughter Emma has been the point of contact and decision maker for pt.   Pt also has a son and Emma reported that pt's son has [employer] and is a part of conversations regarding pt but can have a hard time understanding things sometimes and goes with what Emma wants/says.     Referrals Placed by CM/SW:    Private pay costs discussed: Pending [facility] coverage    Additional Information:  Pt is an 79 year old male who transferred from an OSH with a type A aortic dissection.   Pt was deemed to not be a surgical candidate and has been expected to pass in the hospital.      LEVI was informed by Nurse Manager Gaby that per Palliative Care, a discharge on hospice should be pursued as pt's death may not be imminent.     LEVI spoke with pt's daughter Emma ([phone]) to discuss discharge planning. Emma reported she has \"changed my mind\" and wants information on a home hospice discharge. Emma reported having previously been presented the options of: staying in the hospital, going to a facility with hospice, or going home. Emma reported she initially thought a home discharge would be too stressful but now wants more information.   Emma discussed that pt still has a tear in his aorta and understands this could mean pt could pass any hour, any minute, any day and noted that pt is not really eating or drinking currently.   Emma reported that pt was \"babbling\" earlier and since he has gotten some " "medication his words are more clear but noted she is seeing some hospital delirium.   Emma reported \"I would rather him get home and be comfortable in his home now that he's not hooked up to anything\" and said she will need to learn how to give medications at home.   Emma reported that pt's son/her brother lives with pt but she doesn't want to rely just on him and her to provide pt's cares. Emma reported that PTA, pt had a PCA (Inge) for 3.5 days a week (pt qualifies for 9 /hrs/day). Emma reported that Inge is an independent PCA and doesn't work with a particular agency, she lives 5 minutes from pt, and her schedule can be flexible. Emma reported that Inge and her brother had been splitting PCA hours. Emma reported that pt also had a home nurse and home health aid through [facility]. Emma is really hopeful that [facility] provides hospice cares as this would lead to an easy transition, but this agency does not provide hospice services.   Emma reported that pt is a [employer] and [facility]. Emma inquired about hospice options through the [facility] and LEVI explained that a call will need to be made to the [facility] to inquire about this.     LEVI discussed with Emma, the hospice philosophy and services and discussed how with a home hospice discharge, the main caregiving role falls to family/friends to provide. Emma inquired if pt would be able to get a rodgers for a home discharge.     Emma reported being hopeful pt can get home today and LEVI explained that are some pieces that would need to come together and explained needing to ensure that pt is accepted to a hospice agency to ensure these services would be in place when pt gets home.     SW later informed Emma that [facility] does not offer hospice services. Emma asked that [facility] be contacted to see when the team would be available for an intake and Emma said she will research other options in the meantime.     SW contacted [facility]- spoke with liaison Maritza (p. [phone])- she will check availability and call back, waiting to hear back. Maritza later reported next availability would be either 9:30am or 1pm on Sunday.     LEVI contacted the [facility] coordination line (p. [phone])- left vm for Diane inquiring about benefit status, waiting to hear back. Diane later called and left a vm reporting pt is a part of the the [facility] system and their contact info is (main number- [phone] or home and community care- [phone]). Diane reported that she would recommend setting up hospice under pt's Medicare benefit.     LEVI spoke with Emma again at 12:50pm and she reported that pt is having different behaviors now and is hallucinating.   LEVI explained next availability with [facility] and Emma would like to proceed with this. LEVI explained need to know that there would be someone with pt 24/7 that can assist him and Emma said it would be a combination of APPLE Alcazar (whom she has updated), herself (said she would be staying with pt all the time), and a cousin.   Emma reported that pt already has a hospital bed and w/c at home but would need a commode and over the bed table.     LEVI updated Maritza with [facility] and officially initiated referral. Maritza reported she will start working on things and be in contact with pt's daughter and update SW. Maritza reported it is doubtful pt would be able to get home today still. 2pm update- Maritza left a vm reporting that pt would not be able to be seen until Monday so pt has to be deferred at this time as she would not be comfortable with pt going home and not being seen until Monday.     LEVI contacted the following hospice agencies to inquire about next availability:  [facility] (p. [phone], f. [phone])- spoke with Oralia (direct line- [phone]) and she reported there could possibly be availability this evening or Saturday for an intake. Oralia reported that for a  "weekend discharge 24-48 hours of meds would need to be sent with at discharge. After talking with Emma, referral was sent and SW updated Oralia as to Emma wanting a call to discuss things further. Oralia noted she will watch for the referral, look in Epic, and have someone from her team call Emma.     SW updated Emma at 3pm and she reported that a discharge this evening wouldn't work anymore. Emma is ok with proceeding with a referral to [facility] and would like to speak with someone from the agency to discuss in more details what kind of support would be available. Emma reported that pt started not doing so well again this afternoon and she wants to verfy the support available via hospice to make sure she won't feel \"all alone\". SW explained that [facility] will be updated and explained weekend SW availability to continue in assisting with pt's discharge planning.      LENY Amor, [facility]     [phone] (pager) [phone]  11/20/2020          "

## 2020-11-20 NOTE — PLAN OF CARE
Occupational Therapy Discharge Summary    Reason for therapy discharge:    Discharged to pt transitioned to comfort cares.     Progress towards therapy goal(s). See goals on Care Plan in [facility] electronic health record for goal details.  Goals not met.  Barriers to achieving goals:   change in medical status.    Therapy recommendation(s):    No further therapy is recommended.

## 2020-11-20 NOTE — PLAN OF CARE
7B-PT: Defer: PT consult received and appreciated. Pt has transitioned to comfort cares. No therapy needs at this time. PT to sign off.

## 2020-11-20 NOTE — PLAN OF CARE
Vitals taken per request by daughter.    Status: transferred from an OSH with a type A aortic dissection, transitioned to comfort cares  Neuro: oriented to self, waxes and wanes  GI/:  Inc of B&B  Skin: mepilex to heels, coccyx and BLE  IV Access: PIV SL  Pain: denies  Diet:regular, soft foods, total feed, poor-fair appetite  Activity: Ax2 to turn in bed  This shift: prn seroquel given for restlessness/agitation. Pt seems to be hallucinating at times picking at the air, also has significant shaking of extremities but then comes out of it and converses per baseline. Seroquel (25mg) seemed to help pt relax to sleep some.  Plan:  LEVI working on plan for home with hospice

## 2020-11-20 NOTE — PLAN OF CARE
Activity: Comfort cares, turned per request and PRN.    Neuro: Speech is sometimes incoherent, sometimes logical. Oriented to person, and occasionally place.    GI/: Incontinent of urine x1, also continent of 25cc urine x1. +flatus, no BM this shift.    Diet: Regular, ate a few bites of pudding with meds but politely declined more to eat. Total feed.    Incisions/Drains: n/a    IV Access: PIVs on left and right are both saline locked.    Labs: none this shift    Vitals: Vitals checked per daughter request, patient is hypertensive and saturation O2 is 90%.    Pain: Denies; oral ativan and oral dilaudid are both available.    New changes this shift: Patient has moments of lucidity and clear speech, daughter at bedside.    Plan: Continue POC, probable hospice candidate.

## 2020-11-20 NOTE — PROGRESS NOTES
HOSPICE - [facility]/ hospice is unable to see this patient until Monday. If pt is discharging this today or over the weekend, he will need to be followed by another hospice agency.   Message left for Jenifer SIMS on her personal vm.   If you have any questions or concerns r/t hospice, please contact me.   Thank you for this referral.   Maritza Newell  [facility]  Admis. Referral  Specialist  [phone]

## 2020-11-20 NOTE — PLAN OF CARE
Pt on comfort cares, daughter at the bedside. Pt disoriented to time, place, and situation for most of shift, but orientation does fluctuate. Opens eyes and responds verbally some of the time. If verbally responsive, speech is very garbled. Sensation intact, denies pain/does not show signs of discomfort or agitation/anxiety. L & R PIV SL. Urinary incontinence x1 this shift, perineal care provided. Pt repositioned throughout shift for comfort. Continue to monitor.

## 2020-11-20 NOTE — PROGRESS NOTES
[facility]  Palliative Care Daily Progress Note       Recommendations & Counseling       Goals - comfort care. Pt has stabilized and could live for days / week.  is working with daughter to plan discharge with hospice    DNR/DNI    Pain manamgement - chronic foot pain and pain in chest / back from aortic aneurysm tear. This pain seems to have subsided. Pt has not needed pain medication x 24 hrs - continue to follow closely. Hydromorphone hgh concentrate SL 2-4mg q3 PRN     Agitation, delirium restlessness. Pt's daughter feels lorazepam making this worse. Will try seroquel 12.5-25mg QID PRN    Dyspnea - non now. If he develops dyspnea - hydromorphone SL 2-4mg          Assessments          Андрей Lawson is a 79 year old male with a past medical history significant for Parkinsons dz, episode severe delirium and hallucinations in 2/2020 and significant functional decline over past months, admitted from OSH with type A aortic dissection but determined not to be a good surgical candidate to undergo this type of aortic dissection and patient transitioned to comfort cares.      Today, the patient was seen for:  Parkinsons dz  Delirium / restlessness / agitation  Aortic dissection  Chest and back pain due to above  Chronic foot pain  Goals of care  Family support    Prognosis, Goals, or Advance Care Planning was addressed today with: Yes. Again spoke with pt's daughter and gave prognosis of days to possibly weeks. Explained that he has stabilized for now so would continue his parkinsons medications and also focus on comfort for whatever time he has left. Emma would like hospice arranged at home so he can be with family at end of life.     Mood, coping, and/or meaning in the context of serious illness were addressed today: No.              Interval History:     Chart review/discussion with unit or clinical team members:   No acute events over night    Per patient  or family/caregivers today:  Emma did not feel any evidence of pain. She thought his restlessness got worse with lorazepam yesterday and wanted to try something else for this symptom.              Review of Systems:     Pt not able to answer ROS questions          Medications:     I have reviewed this patient's medication profile and medications during this hospitalization.           Physical Exam:   Vitals were reviewed  Temp: 98.8  F (37.1  C) Temp src: Oral BP: (!) 160/78 Pulse: 78   Resp: 20 SpO2: 90 % O2 Device: None (Room air)    Gen: alert, lying in bed, slightly restless, garbled speech, in NAD  Eyes: Conjunctiva clear. Sclera anicteric   HENT: NCAT; mucous membranes slightly dry  Resp: no increased work of breathing  Skin:  no jaundice  Mental status/Psych: alert, garbled speech, slightly restless; sensorium not intact             Data Reviewed:     Reviewed recent labs and pertinent imaging        Thank you for the opportunity to continue to participate in the care of this patient and family.  Please feel free to contact on-call palliative provider with any emergent needs.  We can be reached via team pager [phone] (answered 8-4:30 Monday-Friday); after-hours answering service ([phone]);     Faye Cool MD / Palliative Medicine / Pager [phone] / [facility] Inpatient Team Consult Pager [phone] (answered 8am-430pm M-F) - ok to text page via [employer] / After-Hours Answering Service [phone] / Palliative Clinic in the [facility] at the [facility] - [phone] (scheduling); [phone] (triage).  Total time spent was 35 minutes,  >50% of time was spent counseling and/or coordination of care regarding symptom management, plan of care, goals of care.

## 2020-11-20 NOTE — PROGRESS NOTES
SPIRITUAL HEALTH SERVICES  SPIRITUAL ASSESSMENT Progress Note (Palliative Focus)  [facility] 7B    REFERRAL SOURCE: Palliative care follow up.    Attempted follow up visit with patient Андрей Lawson times two attempts; however, Андрей was sleeping on each attempt.     Plan: I will follow for spiritual support while Palliative Care is consulted.    Olamide Enamorado  Palliative   Pager [phone]  [facility] Inpatient Team Consult pager [phone] (M-F 8-4:30)  After-hours Answering Service [phone]

## 2020-11-21 PROCEDURE — 250N000013 HC RX MED GY IP 250 OP 250 PS 637

## 2020-11-21 PROCEDURE — 120N000002 HC R&B MED SURG/OB UMMC

## 2020-11-21 PROCEDURE — 99233 SBSQ HOSP IP/OBS HIGH 50: CPT | Performed by: INTERNAL MEDICINE

## 2020-11-21 PROCEDURE — 250N000013 HC RX MED GY IP 250 OP 250 PS 637: Performed by: INTERNAL MEDICINE

## 2020-11-21 RX ORDER — SERTRALINE HYDROCHLORIDE 100 MG/1
100 TABLET, FILM COATED ORAL DAILY
Status: DISCONTINUED | OUTPATIENT
Start: 2020-11-21 | End: 2020-11-23

## 2020-11-21 RX ORDER — BACLOFEN 10 MG/1
10 TABLET ORAL 3 TIMES DAILY
Status: DISCONTINUED | OUTPATIENT
Start: 2020-11-21 | End: 2020-11-23

## 2020-11-21 RX ADMIN — CARBIDOPA AND LEVODOPA 2 TABLET: 25; 100 TABLET ORAL at 09:35

## 2020-11-21 RX ADMIN — CARBIDOPA AND LEVODOPA 2 TABLET: 25; 100 TABLET ORAL at 20:20

## 2020-11-21 RX ADMIN — BACLOFEN 10 MG: 10 TABLET ORAL at 13:04

## 2020-11-21 RX ADMIN — Medication 12.5 MG: at 14:48

## 2020-11-21 RX ADMIN — SERTRALINE HYDROCHLORIDE 100 MG: 100 TABLET ORAL at 13:04

## 2020-11-21 RX ADMIN — Medication 12.5 MG: at 20:20

## 2020-11-21 RX ADMIN — LORAZEPAM 2 MG: 2 SOLUTION, CONCENTRATE ORAL at 17:34

## 2020-11-21 RX ADMIN — BACLOFEN 10 MG: 10 TABLET ORAL at 20:20

## 2020-11-21 RX ADMIN — CARBIDOPA AND LEVODOPA 2 TABLET: 25; 100 TABLET ORAL at 13:04

## 2020-11-21 ASSESSMENT — ACTIVITIES OF DAILY LIVING (ADL)
ADLS_ACUITY_SCORE: 24
ADLS_ACUITY_SCORE: 24
ADLS_ACUITY_SCORE: 25
ADLS_ACUITY_SCORE: 24
ADLS_ACUITY_SCORE: 25
ADLS_ACUITY_SCORE: 25

## 2020-11-21 NOTE — PLAN OF CARE
BP (!) 170/85 (BP Location: Right arm)   Pulse 84   Temp 96.6  F (35.9  C) (Oral)   Resp 22   Wt 97.9 kg (215 lb 13.3 oz)   SpO2 93%   BMI 30.97 kg/m      Status: Type A aortic dissection, transitioned to comfort cares.   Neuro: Oriented only to self, Hx: parkinsons. Speech garbled. Bed alarm on for pt safety. Agitated and restless.   Cardiac: hypertensive, Denies chest pain.   Respiratory: sats 92-94% on RA. Denies SOB.   Pain/Nausea: Denies pain. Denies nausea.   Diet: Regular diet, poor intake.   GI/: Incontinent of bowel and bladder at times. Able to ask to use the urinal occassionally.    Skin: Mepilex on sacrum and LLE, CDI.   IV Access: Right PIV- saline locked.   Activity: Comfort cares, turned and repositioned with assist of 2 q 2-3 hours.    New changes this shift. Pt becoming restless and agitated this shift. PRN seroquel and ativan given X1.   Plan: Discharging home with hospice care tomorrow. Transportation scheduled for 12 noon via stretcher.

## 2020-11-21 NOTE — PROGRESS NOTES
Care Management Discharge Note    Discharge Date: 11/20/20       Discharge Disposition: Hospice    Discharge Services:   hospice.      Discharge DME:  Family will be in contact with hospice about bed options, Андрей has a bed but family wants to see if hospice has larger bed. He also already has a commode at home.     Discharge Transportation: Stretcher ride set up for 12pm on 11/22  ([phone]), Андрей can not sit or stand. Is weak with poor trunk control. PCS form faxed to .   Private pay costs discussed: yes       Education Provided on the Discharge Plan:  spoke with rocky Carter about discharge plan.   Persons Notified of Discharge Plans: Daughter, nursing, hospice.   Patient/Family in Agreement with the Plan:  yes     Additional Information:        DEVORA Jack   11/21/2020    Text paging available through [employer] on [employer] - search SOCIAL WORK    ON CALL PAGER   0800 - 1600   [phone]    ON CALL COVERAGE AFTER 1600  [phone]

## 2020-11-21 NOTE — PROGRESS NOTES
Cardiac Surgery Progress Note    No acute issues overnight. Restarted home meds. Hemodynamically stable on room air.     BP (!) 149/82 (BP Location: Right arm)   Pulse 79   Temp 98.2  F (36.8  C) (Axillary)   Resp 22   Wt 97.9 kg (215 lb 13.3 oz)   SpO2 90%   BMI 30.97 kg/m      Sleeping comfortably    A/P: 79 year old male with type A aortic dissection, non operative. Appreciate comfort care management from palliative service, pt appears very comfortable.     Plan for discharge to home hospice.  Social work/care management continuing efforts to find placement over weekend or early next week.    Discussed with Dr. Love covering for Dr. Bradley over weekend.    Brennen Painter  Cardiac Surgery Fellow  [phone]

## 2020-11-21 NOTE — PROGRESS NOTES
[facility]  Palliative Care Daily Progress Note       Recommendations & Counseling       Plan for discharge home with hospice tomorrow.      Recommend restarting baclofen and sertraline - orders placed for you. We did not recommend to dtr restarting donepezil - minimal benefit/lots of side effects      Discharge with carbidopa-levodopa, baclofen and sertraline as you are, in addition to prn hydromorphone, quetiapine and lorazepam as you are       Stacey Patel MD, *[id number]  Palliative Medicine Fellow  Patient staffed with Moisés Bernal MD, Staff, Palliative Medicine     Attending Note:  Patient seen and evaluated with Dr Patel and I agree with/confirm their findings/recs in this note.    Long discussion with his dtr at bedside about all this. 35 min on unit, 25 counseling with family  Thank you for involving us in the patient's care.   Moisés Bernal MD / Palliative Medicine / Text me via [facility].         Assessments          Mr. Lawson is an 78y/o man with a history of Parkinsonism with substantial functional decline in the past several months transferred from outside hospital with type A aortic dissection.  It was determined that he was not a surgical candidate for repair and a decision as made with family to transfer him to comfort care.  Plan is for discharge home to hospice.    Not entirely clear what shaking episode was yesterday.  However, he has not been on his outpatient sertraline or baclofen, and each of these medications can support his comfort.     Today, the patient was seen for:  Parkinsons disease  Delirium / restlessness / agitation  Aortic dissection  Family support    Prognosis, Goals, or Advance Care Planning was addressed today with: Yes.  Mood, coping, and/or meaning in the context of serious illness were addressed today: No.  Summary/Comments: talked with daughterEmma, about plan for discharge home with hospice            Interval History:  "    Chart review/discussion with unit or clinical team members:   Patient had some episodes of shaking yesterday afternoon/evening.  Quickly resolved and did not appear to be seizure activity per nursing.      Per patient or family/caregivers today:  Daughter, Emma, is at bedside.  She wonders if shaking yesterday might have been from withdrawal from some of his home medications.  Also notes he thought he was trying to get out of a car so wonders if it was confusion and an attempt to get out of bed.    Key Palliative Symptoms (per daughter at bedside):  # Pain severity the last 12 hours: none  # Dyspnea severity the last 12 hours: none  # Nausea severity the last 12 hours: none  # Anxiety severity the last 12 hours: none               Review of Systems:     Besides above, an additional ROS was not possible as the patient was not able to answer questions          Medications:     I have reviewed this patient's medication profile and medications during this hospitalization.    Noted meds:  Carbidopa-levodopa; prn hydromorphone (none), lorazepam (x1), quetiapine 12.5 x3 last 24 hours           Physical Exam:   Vitals were reviewed  Temp: 96.6  F (35.9  C) Temp src: Oral BP: (!) 170/85 Pulse: 84   Resp: 22 SpO2: 93 % O2 Device: None (Room air)    Patient lying in bed, eyes closed.  Opens to his daughter's voice  HEENT:  No icterus, atraumatic  Lungs:  No accessory mm use, normal effort  Abd: scaphoid  Extrem:  No edema  Neuro:  Some resting tremor right hand, some \"picking\" intermittently with both hands, no shaking, patient not oriented but mumbles at times in response to his daughter's voice             Data Reviewed:     Reviewed recent pertinent imaging, comments:   None new    Reviewed recent labs, comments:   None  new         "

## 2020-11-21 NOTE — PLAN OF CARE
Time: 2330-0730  Status: transferred from an OSH with a type A aortic dissection, transitioned to comfort cares  Neuros: oriented to self, speech is very garbled.   Cardiac: Denies chest pain.   Respiratory: LS clear, on room air, snoring.   Pain/Nausea: Pt sleeping overnight, no c/o pain or nausea.   Diet: Regular diet, soft foods, total feed, poor intake.   GI/: Incontinent of bowel and bladder, donna care provided.    Skin: Mepilex on sacrum and LLE c/d/i.   Lines: PIV x2 SL.   Activity: Comfort cares, turned and repositioned with A2 as needed and PRN.   New Changes this Shift: None, no acute changes, daughter at bedside.   Plan: LEVI working on plan for home hospice.

## 2020-11-22 PROCEDURE — 250N000013 HC RX MED GY IP 250 OP 250 PS 637: Performed by: INTERNAL MEDICINE

## 2020-11-22 PROCEDURE — 120N000002 HC R&B MED SURG/OB UMMC

## 2020-11-22 PROCEDURE — 250N000013 HC RX MED GY IP 250 OP 250 PS 637

## 2020-11-22 RX ADMIN — CARBIDOPA AND LEVODOPA 2 TABLET: 25; 100 TABLET ORAL at 08:26

## 2020-11-22 RX ADMIN — CARBIDOPA AND LEVODOPA 2 TABLET: 25; 100 TABLET ORAL at 21:21

## 2020-11-22 RX ADMIN — BACLOFEN 10 MG: 10 TABLET ORAL at 14:02

## 2020-11-22 RX ADMIN — LORAZEPAM 2 MG: 2 SOLUTION, CONCENTRATE ORAL at 08:26

## 2020-11-22 RX ADMIN — BACLOFEN 10 MG: 10 TABLET ORAL at 08:26

## 2020-11-22 RX ADMIN — SERTRALINE HYDROCHLORIDE 100 MG: 100 TABLET ORAL at 08:26

## 2020-11-22 RX ADMIN — CARBIDOPA AND LEVODOPA 2 TABLET: 25; 100 TABLET ORAL at 14:01

## 2020-11-22 ASSESSMENT — ACTIVITIES OF DAILY LIVING (ADL)
ADLS_ACUITY_SCORE: 25

## 2020-11-22 NOTE — PLAN OF CARE
BP (!) 161/79 (BP Location: Right arm)   Pulse 85   Temp 98  F (36.7  C) (Axillary)   Resp 24   Wt 85.9 kg (189 lb 6 oz)   SpO2 94%   BMI 27.17 kg/m      Time: 2330-0730  Status: transferred from an OSH with a type A aortic dissection, transitioned to comfort cares  Neuros: oriented to self, speech garbled, bed alarm on for safety.   Cardiac: HTN, denies chest pain.   Respiratory: LS clear, on room air, snoring.   Pain/Nausea: Pt sleeping overnight, no c/o pain or nausea.   Diet: Regular diet, poor intake.   GI/: Incontinent of bowel and bladder, donna care provided.    Skin: Mepilex on sacrum and LLE c/d/i.   Lines: Right PIV SL.   Activity: Comfort cares, turned and repositioned q 2-3 hrs with A2.   New Changes this Shift: None, no acute changes, daughter at bedside.   Plan: discharge home with hospice care, stretcher ride set up for 12pm on 11/22 per SW note.

## 2020-11-22 NOTE — PLAN OF CARE
/63 (BP Location: Right arm)   Pulse 55   Temp 97.1  F (36.2  C) (Axillary)   Resp 24   Wt 85.9 kg (189 lb 6 oz)   SpO2 93%   BMI 27.17 kg/m      *daughter asked for vital signs to be completed.     Shift: 2978-8417  Status: transferred from an OSH with a type A aortic dissection, transitioned to comfort cares  Neuros: RO; speech garbled, bed alarm on for safety.   Cardiac: WNL  Respiratory: LS clear, snoring, daughter asked for oxygen PRN   Pain/Nausea: slight agitation with cares; ativan given IV; no signs of nausea   Diet: Regular diet, poor intake - not eating.  GI/: Incontinent of bowel and bladder, donna care provided; brief on patient.   Skin: Mepilex on sacrum (changed today) and LLE mepilex c/d/i.   Lines: Right PIV SL.   Activity: Comfort cares, turned and repositioned q 2-3 hrs with A2.   New Changes this Shift: daughter changed plan to Hospice facility at discharge  Plan: discharge pending placement

## 2020-11-22 NOTE — PROGRESS NOTES
Care Management Follow Up    Length of Stay (days): 4    Expected Discharge Date: 11/20/20     Concerns to be Addressed: discharge planning     Patient plan of care discussed at interdisciplinary rounds: No    [phone]. [facility]     Anticipated Discharge Services:  hospice, SNF   Anticipated Discharge DME:  none     Patient/family educated on Medicare website which has current facility and service quality ratings:  yes   Education Provided on the Discharge Plan:  yes  Patient/Family in Agreement with the Plan:  family now decided on SNF discharge plan with hospice. Educated on visitor restrictions.     Private pay costs discussed: Has medical assistance, daughter also looking into VA coverage options.     Additional Information:  Daughter wanted to talk with social work about discharge plans. She is thinking that she will not be able to meet Андрей' care needs at home. She is thinking she will not have enough support (brother and daughter have disabilities, hasn't heard from [facility]). She states Андрей is trying to get out of bed a lot, is in pain and yelling at her; this is giving her anxiety and she is thinking this will not be manageable for her. Spoke with nursing to update, canceled stretcher ride and updated [facility]. Daughter wants to look into SNF options for hospice c  Provided nursing home list printed from medicare web site (fa t unit).     4pm update: Spoke with daughter. [facility]. Referrals sent through [employer].     Blessing Chaudhary .2/2020    Text paging available through [employer] on [employer] - search SOCIAL WORK    ON CALL PAGER   0800 - 1600   [phone]  ON CALL COVERAGE AFTER 1600  [phone]

## 2020-11-23 PROCEDURE — 99231 SBSQ HOSP IP/OBS SF/LOW 25: CPT | Performed by: PHYSICIAN ASSISTANT

## 2020-11-23 PROCEDURE — 99233 SBSQ HOSP IP/OBS HIGH 50: CPT | Performed by: INTERNAL MEDICINE

## 2020-11-23 PROCEDURE — 250N000009 HC RX 250: Performed by: PHYSICIAN ASSISTANT

## 2020-11-23 PROCEDURE — 250N000013 HC RX MED GY IP 250 OP 250 PS 637: Performed by: INTERNAL MEDICINE

## 2020-11-23 PROCEDURE — 250N000013 HC RX MED GY IP 250 OP 250 PS 637

## 2020-11-23 PROCEDURE — 120N000002 HC R&B MED SURG/OB UMMC

## 2020-11-23 PROCEDURE — 250N000013 HC RX MED GY IP 250 OP 250 PS 637: Performed by: PHYSICIAN ASSISTANT

## 2020-11-23 RX ORDER — ACETAMINOPHEN 325 MG/10.15ML
650 LIQUID ORAL 3 TIMES DAILY
Status: DISCONTINUED | OUTPATIENT
Start: 2020-11-23 | End: 2020-11-24 | Stop reason: HOSPADM

## 2020-11-23 RX ORDER — BISACODYL 10 MG
10 SUPPOSITORY, RECTAL RECTAL DAILY PRN
Status: DISCONTINUED | OUTPATIENT
Start: 2020-11-23 | End: 2020-11-24 | Stop reason: HOSPADM

## 2020-11-23 RX ORDER — FAMOTIDINE 40 MG/5ML
20 POWDER, FOR SUSPENSION ORAL 2 TIMES DAILY
Qty: 150 ML | Refills: 0 | Status: SHIPPED | OUTPATIENT
Start: 2020-11-23 | End: 2020-11-24

## 2020-11-23 RX ORDER — SERTRALINE HYDROCHLORIDE 20 MG/ML
100 SOLUTION ORAL DAILY
Qty: 60 ML | Refills: 3 | Status: SHIPPED | OUTPATIENT
Start: 2020-11-24 | End: 2020-11-24

## 2020-11-23 RX ORDER — CARBIDOPA AND LEVODOPA 25; 100 MG/1; MG/1
2 TABLET, ORALLY DISINTEGRATING ORAL 3 TIMES DAILY
Qty: 120 TABLET | Refills: 1 | Status: SHIPPED | OUTPATIENT
Start: 2020-11-23

## 2020-11-23 RX ORDER — FLUORIDE TOOTHPASTE
15 TOOTHPASTE DENTAL 4 TIMES DAILY
Qty: 1 BOTTLE | Refills: 1 | Status: SHIPPED | OUTPATIENT
Start: 2020-11-23

## 2020-11-23 RX ORDER — CARBIDOPA AND LEVODOPA 25; 100 MG/1; MG/1
2 TABLET, ORALLY DISINTEGRATING ORAL 3 TIMES DAILY
Status: DISCONTINUED | OUTPATIENT
Start: 2020-11-23 | End: 2020-11-24 | Stop reason: HOSPADM

## 2020-11-23 RX ORDER — AMOXICILLIN 250 MG
2 CAPSULE ORAL 2 TIMES DAILY
Status: DISCONTINUED | OUTPATIENT
Start: 2020-11-23 | End: 2020-11-23

## 2020-11-23 RX ORDER — SERTRALINE HYDROCHLORIDE 20 MG/ML
100 SOLUTION ORAL DAILY
Status: DISCONTINUED | OUTPATIENT
Start: 2020-11-24 | End: 2020-11-24 | Stop reason: HOSPADM

## 2020-11-23 RX ORDER — FLUORIDE TOOTHPASTE
15 TOOTHPASTE DENTAL 4 TIMES DAILY
Status: DISCONTINUED | OUTPATIENT
Start: 2020-11-23 | End: 2020-11-24 | Stop reason: HOSPADM

## 2020-11-23 RX ADMIN — CARBIDOPA AND LEVODOPA 2 TABLET: 25; 100 TABLET, ORALLY DISINTEGRATING ORAL at 21:07

## 2020-11-23 RX ADMIN — CARBIDOPA AND LEVODOPA 2 TABLET: 25; 100 TABLET, ORALLY DISINTEGRATING ORAL at 13:52

## 2020-11-23 RX ADMIN — LORAZEPAM 1 MG: 2 SOLUTION, CONCENTRATE ORAL at 05:34

## 2020-11-23 RX ADMIN — LORAZEPAM 1 MG: 2 SOLUTION, CONCENTRATE ORAL at 02:29

## 2020-11-23 RX ADMIN — BISACODYL 10 MG: 10 SUPPOSITORY RECTAL at 16:14

## 2020-11-23 RX ADMIN — ACETAMINOPHEN ORAL SOLUTION 650 MG: 325 SOLUTION ORAL at 13:52

## 2020-11-23 RX ADMIN — BACLOFEN 10 MG: 10 TABLET ORAL at 13:52

## 2020-11-23 RX ADMIN — ACETAMINOPHEN ORAL SOLUTION 650 MG: 325 SOLUTION ORAL at 11:39

## 2020-11-23 RX ADMIN — LORAZEPAM 1 MG: 2 SOLUTION, CONCENTRATE ORAL at 13:36

## 2020-11-23 RX ADMIN — BACLOFEN 10 MG: 10 TABLET ORAL at 10:02

## 2020-11-23 RX ADMIN — CARBIDOPA AND LEVODOPA 2 TABLET: 25; 100 TABLET ORAL at 10:02

## 2020-11-23 RX ADMIN — CARVEDILOL 6.25 MG: 25 TABLET, FILM COATED ORAL at 11:40

## 2020-11-23 RX ADMIN — Medication 10 MG: at 20:56

## 2020-11-23 ASSESSMENT — ACTIVITIES OF DAILY LIVING (ADL)
ADLS_ACUITY_SCORE: 25

## 2020-11-23 NOTE — PROGRESS NOTES
CVTS:     Planning DC to home for Hospice care on Tues, 11/24.   POLST form in patient chart, will need to be signed by daughter.   Will place Torres catheter for discharge per Daughter's request due to incontinence.   All medications are liquid form for ease of taking.    Jaxson Sanchez PA-C  Cardiothoracic Surgery  Pager [phone]    3:00 PM   November 23, 2020

## 2020-11-23 NOTE — PROGRESS NOTES
Care Management Follow Up    Length of Stay (days): 5    Expected Discharge Date: 11/24/20     Concerns to be Addressed: discharge planning     Patient plan of care discussed at interdisciplinary rounds: Yes    Anticipated Discharge Disposition: Hospice     Anticipated Discharge Services:  SNF with [facility]  Anticipated Discharge DME:  NA    Patient/family educated on Medicare website which has current facility and service quality ratings:  See previous  documentation  Education Provided on the Discharge Plan:  Yes- see previous SW documentation  Patient/Family in Agreement with the Plan:  See previous SW documentation    Referrals Placed by CM/SW:  See previous SW documentation  Private pay costs discussed: Not applicable    Additional Information:  Pt is an 79 year old male who transferred from an OSH with a type A aortic dissection.   Pt was deemed to not be a surgical candidate and has been expected to pass in the hospital, however now a discharge on hospice is being pursued. The original plan was for a home discharge with hospice, but per chart review and dtr Emma's conversations with weekend  staff, the plan has now switched to SNF placement with hospice and SNF referrals have been made.      Received update from Cardio-Thoracic team that pt remains stable to leave the hospital.     LEVI contacted the following SNFs to follow up on referrals:  - [facility] (p. [phone], f. [phone])- left vm for admissions, waiting to hear back. Diane later called and left a vm reporting no bed availability.   - [facility] (p. [phone], f. [phone])- left vm for admissions, waiting to hear back. Dg called and left a vm reporting no bed availability and unknown when next opening will be.     LEVI spoke with pt's daughter Emma ([phone]) to check in and discuss discharge planning. Emma discussed being tired and how she hasn't left pt's side  "at all and her  has been bringing her things to shower at the hospital.   Emma reported she could really use some hospice support right now and discussed not understanding the ups and downs with pt's condition and what everything means.   Emma discussed her wavering between a home hospice discharge and a facility hospice discharge. Emma stated \"I get too upset not knowing what to do and it's scary\". SW provided active and supportive listening and normalized and validated Emma's feelings.   SW explained the visitor restrictions at SNFs and that they vary so much from one place to the next. SW explained the option of [facility] but Emma declined this saying it is too far from home. Emma discussed wanting to be able to be at pt's side to tend to him and said this is the nurturing tendency in her and it helps her feel better. Emma said, \"I have to be by him...it makes me feel better\". LEVI explained that unfortunately, due to Covid, there is no facility that pt could go to that would allow her to be with him around the clock. Emma then asked if pt could stay in the hospital and SW explained that unfortunately, at this time, a discharge of some sort needs to be pursued. Emma said pt will go home then. LEVI explained wanting to ensure that Emma knows that at home, if she sees the agitation etc like in the hospital, there will not be a hospice nurse able to be at the house immediately, but explained there is a triage line that Emma can call anytime for support and there are people who can walk her what to do in any given situation. LEVI asked if Emma if for sure ok with proceeding with a home discharge and Emma replied, \"it is what it is and I'll have to deal with it\". Emma also said, \"it's either here or there\", and that she won't let pt go somewhere where she can't be with him.   LEVI offered another call from the [facility] liaison to go over things one more time before discharge and explained " that a discharge today could still be an option. Emma requested this phone call to discuss things further.     LEVI contacted [facility] liaison Krupa ([phone], f. [phone])- provided update on situation and how Emma is feeling about things and the support she would need for a home discharge including SW involvement. Krupa said she will call Emma right away to discuss things then call LEVI back, waiting to hear back. Krupa later left a  reporting that the plan is for a discharge Tuesday morning. LEVI returned call to Krupa, left  and awaiting return call. LEVI later spoke with Krupa and she reported that Emma is prepared to have pt discharge to home tomorrow morning. Krupa reported pt has all necessary equipment at home already, everything else can be assessed for once home. Krupa requested that 2 days of medications be sent with and be billed to pt's hospital stay insurance then hospice will take over upon enrollment.     LEVI updated pt's medical team as to discharge plan and requested a completed POLST form and that pt's discharge meds be sent to the discharge pharmacy [facility] so they are ready right away in the morning.     Stretcher transport with oxygen arranged via [facility] ([phone]) for Tuesday 11/24 at 10:30am.     LEVI updated Krupa with [facility] and she reported that an intake appt will be scheduled for 11:30am Tuesday to take place at pt's home.     LEVI updated Emma as to transport scheduled and intake appointment time. LEVI also informed Emma as to potential out of pocket expense for transport. Emma asked what would happen if pt gets home and things really are not going well and LEVI explained that a hospice SW will be available to her from the beginning and this person would be her point of contact and would help facilitate next steps and placement if needed. Emma discussed how her dad is her everything and her rock, and noted that he knows her  better than anyone. LEVI explained that the hospice team will support her through this time and also be available to support her after her dad's passing and Emma reporting looking for this support.     IMM was reviewed by phone with Emma.     LEVI updated pt's bedside nurse as to discharge plan and meds being filled in discharge pharmacy.       LEVI updated Barbie with [facility] as she called requesting an update.      LENY Amor, [facility]     [phone] (pager) [phone]  11/23/2020

## 2020-11-23 NOTE — PROGRESS NOTES
[facility]  Palliative Care Daily Progress Note       Recommendations & Counseling       Awaiting discharge plan - no beds available at the facilities daughter had been interested in.  A priority is that daughter could visit most of the time, and if that is unavailable and his agitation episodes stay controlled, would consider taking him home.     Continue current comfort medications.  Will add bisocodyl suppository PRN as has not had a BM in several days.    If unable to take seroquel, would rely on lorazepam for agitation.  If further medication needed, would NOT use Haldol liquid due to drug-disease interaction with  Parkinsons.  Risperdal liquid 1 mg if severe, but still would prefer to avoid antipsychotics if possible.           Assessments          Mr. Lawson is an 80y/o man with a history of Parkinsonism with substantial functional decline in the past several months transferred from outside hospital with type A aortic dissection.  It was determined that he was not a surgical candidate for repair and a decision as made with family to transfer him to comfort care.  He had significant agitation and restlessness earlier in stay, requiring seroquel and ativan, but for last 36 hours has been more hypoactive.      During periods of agitation, daughter felt she couldn't care for him alone.  She has not left the hospital because was concerned she wouldn't be allowed back and wants to be with him the majority of the time.  For past day and a half, has been primarily sleeping with a few moments of being more aware, not eating, now not swallowing medications or applesauce.  Daughter says her priority is to be with him, and discussed today that many facilities have instituted visitor restrictions, and in that case she would want him home.  If he stayed with less agitation, she feels she would be able to care for him in home hospice.  She hasn't talked to a hospice yet.      Today, the patient was seen for:  Parkinsons disease  Delirium / restlessness / agitation  Aortic dissection  Family support    Prognosis, Goals, or Advance Care Planning was addressed today with: Yes.  Mood, coping, and/or meaning in the context of serious illness were addressed today: No.  Summary/Comments: talked with daughterEmma, about plan for discharge home vs facility with hospice            Interval History:     Chart review/discussion with unit or clinical team members:   No further episodes of shaking.  Now no longer taking oral medications.      Per patient or family/caregivers today:  DaughterEmma, is at bedside.  She says he once tried to say her name, but otherwise has been primarily sleeping.  Denies signs of pain or distress.  No further shaking episodes.     Key Palliative Symptoms (per daughter at bedside):  # Pain severity the last 12 hours: none  # Dyspnea severity the last 12 hours: none  # Nausea severity the last 12 hours: none  # Anxiety severity the last 12 hours: none               Review of Systems:     Besides above, an additional ROS was not possible as the patient was not able to answer questions          Medications:     I have reviewed this patient's medication profile and medications during this hospitalization.    Noted meds:  APAP 650 TID, Carbidopa-levodopa; prn hydromorphone (none), lorazepam (x2), quetiapine 12.5 (none),  baclofen 10 mg TID           Physical Exam:   Vitals were reviewed  Temp: 99  F (37.2  C) Temp src: Axillary BP: (!) 170/93(will retake) Pulse: 87   Resp: 20 SpO2: 94 % O2 Device: Nasal cannula Oxygen Delivery: 3 LPM  Patient lying in bed, eyes closed.  Does not open to tactile stimuli or daughter  Lungs:  No accessory mm use, normal effort  Abd: soft  Extrem:  No edema  Neuro:  Intermittently moving legs, no tremor             Data Reviewed:     Reviewed recent pertinent imaging, comments:   None new    Reviewed recent labs, comments:   None  new       40 minutes unit time spent in reviewing record, patient examination, discussion with daughter,  and documentation.  25 minutes spent with daughter, >50% spent in counseling re: symptom management and hospice planning.     Dali Dutta MD  Palliative Medicine  Pager [phone]

## 2020-11-23 NOTE — PLAN OF CARE
BP (!) 150/80 (BP Location: Left arm)   Pulse 77   Temp 96.2  F (35.7  C) (Axillary)   Resp 20   Wt 85.9 kg (189 lb 6 oz)   SpO2 95%   BMI 27.17 kg/m         Time: 7903-6700  Status: transferred from an OSH with a type A aortic dissection, transitioned to comfort cares  Neuros: Somnolent, restless/agitation at times, bed alarm on for safety.   Cardiac: HTN, denies chest pain.   Respiratory: Sats 95% on 3L NC for comfort. LS slightly coarse, snoring.   Pain: Comfort cares provided, ativan prn x2 for restless/agitation with relief.   Nausea: no nausea.   Diet: Regular diet, poor intake -not eating mouth swabs done for comfort.   GI/: Incontinent of urine, no stool, donna care provided.    Skin: Mepilex on sacrum and LLE c/d/i.   Lines: Right PIV SL.   Activity: Comfort cares, turned and repositioned q 2 hrs and prn with A2.   New Changes this Shift: Ativan prn x2 for restless/ agitation. Daughter at bedside.   Plan: Discharge pending placement,  to follow up for possible referrals to hospice centers. Continue POC.

## 2020-11-23 NOTE — PROGRESS NOTES
Cardiovascular Surgery Progress Note  11/23/2020         Assessment and Plan:     Андрей Lawson is a 79 year old male with PMH of Parkinsons transferred from [facility] with type A aortic dissection. On arrival patient was bradycardic in the 40s and irregular with SBP on the right arm in the 180s and SBPs on the left of 70's. Per EMS patient had been reciving Esmolol and labetalol for BP control. In discussion with patient's daughter she confirmed that her father would not want heroic measures or to be intubated based on poor prognosis and recovery from sternotomy.  Patient was admitted to CVICU for HD support.  Patient endorses chest and back pain. Denies SOB.   PATIENT IS DNR/DNI     Cardiovascular:   Hx of HTN, Type A aortic dissection  - Coreg 6.25 mg BID for HTN   - No arrhythmia, HD stable    Pulmonary:  - Saturating well on 3 lpm.   - Supplemental O2 PRN to keep sats > 92%. Wean off as tolerated.    Neurology / MSK:  Hx Parkinson's disease  Hx Spinal Stenosis   - PTA: Sinemet TID, Zoloft 100 mg  Restlessness and agitation   - Has bed alarm. Seroquel 12.5 mg PO QID PRN, Ativan 1-2 mg PO q 3 hrs PRN   Back and Leg pain   - Scheduled APAP 650 mg TID for pain. PRN Dilaudid solution 1-2 mg q 3 hrs PRN PO while inpatient.      / Renal:  - Urinary incontinence  - No Hx of renal disease. Most recent creatinine WNL, adequate UOP.     GI / FEN:   GERD  - PTA protonix  - Regular diet, continue bowel regimen (await first BM)  - Not eating very much    Endocrine:  No acute issues     Infectious Disease:  - Stress induced leukocytosis; WBC 13.1, remains afebrile, no signs or symptoms of infection  - Completed perioperative antibiotics    Hematology:   Anemia and thrombocytopenia of acute disease   Hgb 10's; Plt 120's, no signs or symptoms of active bleeding    Anticoagulation:   - ASA only    Prophylaxis:   - DVT prophylaxis: Subcutaneous heparin, SCD    Disposition:   - Transferred to  off Tele  - Discussing  with SW for Hospice / Comfort Cares placement.     Discussed with CVTS Fellow as needed.  Staff surgeons have been informed of changes through both verbal and written communication.      Jaxson Sanchez PA-C  Cardiothoracic Surgery  Pager [phone]    7:37 AM   November 23, 2020        Interval History:     No overnight events. Mostly unresponsive and some occasional episodes appearing to be agitation vs pain.   He is really not eating per his daughter. No BM since admit. No nausea or vomiting.  Breathing effort normal on O2.          Physical Exam:   Blood pressure (!) 150/80, pulse 77, temperature 96.2  F (35.7  C), temperature source Axillary, resp. rate 20, weight 85.9 kg (189 lb 6 oz), SpO2 95 %.  Vitals:    11/19/20 1728 11/21/20 1752   Weight: 97.9 kg (215 lb 13.3 oz) 85.9 kg (189 lb 6 oz)      Gen: sleeping, NAD, occasionally awakes with R>L arm tremors.   Neuro: rouses to voice and attempts to answer questions, mostly unintelligible speech   CV: RRR, normal S1 S2, no murmurs, rubs or gallops.   Pulm: CTA, no wheezing or rhonchi, normal breathing on 3 lpm  Abd: nondistended, normal BS, soft, nontender  Ext: no peripheral edema         Data:    Imaging:  reviewed recent imaging, no acute concerns    Labs:  BMP  Recent Labs   Lab 11/18/20  0533      POTASSIUM 4.4   CHLORIDE 110*   JENNIFER 7.8*   CO2 22   BUN 33*   CR 1.17   *     CBC  Recent Labs   Lab 11/18/20  0936 11/18/20  0533   WBC  --  13.1*   RBC  --  3.43*   HGB 11.2* 10.8*   HCT  --  34.9*   MCV  --  102*   MCH  --  31.5   MCHC  --  30.9*   RDW  --  15.5*   PLT  --  128*     INR  No lab results found in last 7 days.   Liver Function Studies - No results for input(s): PROTTOTAL, ALBUMIN, BILITOTAL, ALKPHOS, AST, ALT, BILIDIRECT in the last 66112 hours.  GLUCOSE:   Recent Labs   Lab 11/18/20  0935 11/18/20  0703 11/18/20  0533   GLC  --   --  128*   * 103*  --

## 2020-11-23 NOTE — PROVIDER NOTIFICATION
Paged MD regarding pt bp 170's, and pt difficulty with swallowing.  MD stated there is no intervention for blood pressure and that most of his medications cannot be IV form.  Paged MD again this afternoon with daughter wanting to take pt home- if there was a need for COVID swab and if we could place rodgers.    MD ok'd rodgers, and no need for COVID swab, also wants suppository before patient discharges.

## 2020-11-23 NOTE — PROGRESS NOTES
CVTS Note    No acute medical issues.     Plan for home hospice discharge cancelled.  Daughter may not be able to manage pt's needs.  Referrals to hospice centers in process.  Will follow up with social work and care management on Monday for possible options.    Brennen Painter  Cardiac Surgery Fellow  [phone]

## 2020-11-23 NOTE — PLAN OF CARE
/70 (BP Location: Left arm)   Pulse 82   Temp 98  F (36.7  C) (Axillary)   Resp 20   Wt 85.9 kg (189 lb 6 oz)   SpO2 94%   BMI 27.17 kg/m         PT slept most of shift, RO neuro cardiac/respiratory/pain. LS clear, snoring, on 2L for comfort per daughter request. Difficult to swallow oral medications. RPIV SL. Reposition every 2 hrs with Ax2. Discharge pending, continue POC

## 2020-11-24 ENCOUNTER — DOCUMENTATION ONLY (OUTPATIENT)
Dept: SURGERY | Facility: CLINIC | Age: 80
End: 2020-11-24

## 2020-11-24 VITALS
SYSTOLIC BLOOD PRESSURE: 103 MMHG | TEMPERATURE: 98.6 F | RESPIRATION RATE: 22 BRPM | OXYGEN SATURATION: 96 % | HEART RATE: 81 BPM | WEIGHT: 189.38 LBS | BODY MASS INDEX: 27.17 KG/M2 | DIASTOLIC BLOOD PRESSURE: 63 MMHG

## 2020-11-24 LAB
ALBUMIN SERPL-MCNC: 3.3 G/DL (ref 3.4–5)
ALP SERPL-CCNC: 118 U/L (ref 40–150)
ALT SERPL W P-5'-P-CCNC: 14 U/L (ref 0–70)
ANION GAP SERPL CALCULATED.3IONS-SCNC: 10 MMOL/L (ref 3–14)
AST SERPL W P-5'-P-CCNC: 34 U/L (ref 0–45)
BASOPHILS # BLD AUTO: 0.1 10E9/L (ref 0–0.2)
BASOPHILS NFR BLD AUTO: 0.3 %
BILIRUB SERPL-MCNC: 1.3 MG/DL (ref 0.2–1.3)
BUN SERPL-MCNC: 62 MG/DL (ref 7–30)
CALCIUM SERPL-MCNC: 9.5 MG/DL (ref 8.5–10.1)
CHLORIDE SERPL-SCNC: 120 MMOL/L (ref 94–109)
CO2 SERPL-SCNC: 20 MMOL/L (ref 20–32)
CREAT SERPL-MCNC: 1.34 MG/DL (ref 0.66–1.25)
DIFFERENTIAL METHOD BLD: ABNORMAL
EOSINOPHIL # BLD AUTO: 0.1 10E9/L (ref 0–0.7)
EOSINOPHIL NFR BLD AUTO: 0.8 %
ERYTHROCYTE [DISTWIDTH] IN BLOOD BY AUTOMATED COUNT: 14.9 % (ref 10–15)
GFR SERPL CREATININE-BSD FRML MDRD: 49 ML/MIN/{1.73_M2}
GLUCOSE SERPL-MCNC: 118 MG/DL (ref 70–99)
HCT VFR BLD AUTO: 47.9 % (ref 40–53)
HGB BLD-MCNC: 15 G/DL (ref 13.3–17.7)
IMM GRANULOCYTES # BLD: 0.1 10E9/L (ref 0–0.4)
IMM GRANULOCYTES NFR BLD: 0.6 %
LYMPHOCYTES # BLD AUTO: 1.1 10E9/L (ref 0.8–5.3)
LYMPHOCYTES NFR BLD AUTO: 6.3 %
MCH RBC QN AUTO: 31.8 PG (ref 26.5–33)
MCHC RBC AUTO-ENTMCNC: 31.3 G/DL (ref 31.5–36.5)
MCV RBC AUTO: 102 FL (ref 78–100)
MONOCYTES # BLD AUTO: 1 10E9/L (ref 0–1.3)
MONOCYTES NFR BLD AUTO: 5.5 %
NEUTROPHILS # BLD AUTO: 15.5 10E9/L (ref 1.6–8.3)
NEUTROPHILS NFR BLD AUTO: 86.5 %
NRBC # BLD AUTO: 0 10*3/UL
NRBC BLD AUTO-RTO: 0 /100
PLATELET # BLD AUTO: 318 10E9/L (ref 150–450)
POTASSIUM SERPL-SCNC: 3.7 MMOL/L (ref 3.4–5.3)
PROT SERPL-MCNC: 8.2 G/DL (ref 6.8–8.8)
RBC # BLD AUTO: 4.72 10E12/L (ref 4.4–5.9)
SODIUM SERPL-SCNC: 150 MMOL/L (ref 133–144)
WBC # BLD AUTO: 17.9 10E9/L (ref 4–11)

## 2020-11-24 PROCEDURE — 99232 SBSQ HOSP IP/OBS MODERATE 35: CPT | Performed by: INTERNAL MEDICINE

## 2020-11-24 PROCEDURE — 250N000013 HC RX MED GY IP 250 OP 250 PS 637: Performed by: INTERNAL MEDICINE

## 2020-11-24 PROCEDURE — 99238 HOSP IP/OBS DSCHRG MGMT 30/<: CPT | Performed by: PHYSICIAN ASSISTANT

## 2020-11-24 PROCEDURE — 36415 COLL VENOUS BLD VENIPUNCTURE: CPT | Performed by: PHYSICIAN ASSISTANT

## 2020-11-24 PROCEDURE — 80053 COMPREHEN METABOLIC PANEL: CPT | Performed by: PHYSICIAN ASSISTANT

## 2020-11-24 PROCEDURE — 250N000009 HC RX 250: Performed by: PHYSICIAN ASSISTANT

## 2020-11-24 PROCEDURE — 250N000013 HC RX MED GY IP 250 OP 250 PS 637: Performed by: PHYSICIAN ASSISTANT

## 2020-11-24 PROCEDURE — 85025 COMPLETE CBC W/AUTO DIFF WBC: CPT | Performed by: PHYSICIAN ASSISTANT

## 2020-11-24 RX ORDER — LORAZEPAM 2 MG/ML
1 CONCENTRATE ORAL EVERY 6 HOURS PRN
Qty: 30 ML | Refills: 0 | Status: SHIPPED | OUTPATIENT
Start: 2020-11-24

## 2020-11-24 RX ORDER — BISACODYL 10 MG
10 SUPPOSITORY, RECTAL RECTAL DAILY PRN
Qty: 10 SUPPOSITORY | Refills: 0 | Status: SHIPPED | OUTPATIENT
Start: 2020-11-24

## 2020-11-24 RX ORDER — HYDROMORPHONE HYDROCHLORIDE 1 MG/ML
1-2 SOLUTION ORAL
Qty: 100 ML | Refills: 0 | Status: SHIPPED | OUTPATIENT
Start: 2020-11-24

## 2020-11-24 RX ADMIN — ACETAMINOPHEN ORAL SOLUTION 650 MG: 325 SOLUTION ORAL at 09:30

## 2020-11-24 RX ADMIN — CARBIDOPA AND LEVODOPA 2 TABLET: 25; 100 TABLET, ORALLY DISINTEGRATING ORAL at 09:26

## 2020-11-24 RX ADMIN — CARBIDOPA AND LEVODOPA 2 TABLET: 25; 100 TABLET, ORALLY DISINTEGRATING ORAL at 14:18

## 2020-11-24 RX ADMIN — LORAZEPAM 1 MG: 2 SOLUTION, CONCENTRATE ORAL at 04:42

## 2020-11-24 RX ADMIN — DOCUSATE SODIUM 100 MG: 50 LIQUID ORAL at 09:34

## 2020-11-24 RX ADMIN — Medication 10 MG: at 09:24

## 2020-11-24 RX ADMIN — CARVEDILOL 12.5 MG: 25 TABLET, FILM COATED ORAL at 09:29

## 2020-11-24 RX ADMIN — SERTRALINE HYDROCHLORIDE 100 MG: 20 SOLUTION ORAL at 09:33

## 2020-11-24 RX ADMIN — PANTOPRAZOLE SODIUM 40 MG: 40 TABLET, DELAYED RELEASE ORAL at 09:30

## 2020-11-24 RX ADMIN — Medication 10 MG: at 14:12

## 2020-11-24 RX ADMIN — LORAZEPAM 1 MG: 2 SOLUTION, CONCENTRATE ORAL at 18:49

## 2020-11-24 ASSESSMENT — ACTIVITIES OF DAILY LIVING (ADL)
ADLS_ACUITY_SCORE: 21
ADLS_ACUITY_SCORE: 21
ADLS_ACUITY_SCORE: 23
ADLS_ACUITY_SCORE: 21
ADLS_ACUITY_SCORE: 25

## 2020-11-24 NOTE — PLAN OF CARE
Activity: Assist of 2  Neuros: RO orientation, garbled speech, somnolent  Cardiac: Comfort cares, VS per family  Respiratory: Seems to be breathing comfortably.   GI/: Incontinent of bowel. Torres catheter in place, leaking at site.   Diet: Regular, does not tolerate very well.   Skin: scattered scabbing on legs.   Lines: No IV access  Pain/nausea: Sleeping intermittently, ativan given x1.  Plan:  Pt to discharge on hospice with family today.

## 2020-11-24 NOTE — PROGRESS NOTES
[facility]  Palliative Care Daily Progress Note       Recommendations & Counseling     At discharge recommend including the following  hospice PRN orders:  - Bisacodyl 10 mg Suppository SD daily to bid prn constipation  - Tylenol 650 mg suppository PO/SD q4hr prn pain, fever  - Lorazepam  0.5-1.0 mg PO/SL/SD q4h prn anxiety/restlessness  - Atropine sulfate 1% opth solution 1-2 drops SL q2h prn secretions  - Hydromorphone 1-2 mg SL q2h PRN    - Continue current liquid medications when he is able to take them.  Seroquel does not come as a liquid, agitation has been controlled for past few days without an antipsychotic.  Would NOT use Haldol liquid due to drug-disease interaction with  Parkinsons.  Risperdal liquid 1 mg could be used if severe, but still would prefer to avoid antipsychotics if possible.   - If restless at home, recommended giving dose of ativan first.  If continued restlessness after 30 minutes, try dose of hydromorphone.                 Assessments          Mr. Lawson is an 78y/o man with a history of Parkinsonism with substantial functional decline in the past several months transferred from outside hospital with type A aortic dissection.  It was determined that he was not a surgical candidate for repair and a decision as made with family to transfer him to comfort care.  He had significant agitation and restlessness earlier in stay, requiring seroquel and ativan, but for last 3 days has been more hypoactive.      Decision to go home with hospice, planning to meet them today    Today, the patient was seen for:  Parkinsons disease  Delirium / restlessness / agitation  Aortic dissection  Family support    Prognosis, Goals, or Advance Care Planning was addressed today with: Yes.  Mood, coping, and/or meaning in the context of serious illness were addressed today: No.  Summary/Comments: talked with daughterEmma, about plan for discharge home              Interval History:     Chart review/discussion with unit or clinical team members:   No further episodes of shaking.  Intermittently taking oral medications.     Per patient or family/caregivers today:  DaughterEmma, is at bedside. More restlessness overnight, responded to ativan    Key Palliative Symptoms (per daughter at bedside):  # Pain severity the last 12 hours: none  # Dyspnea severity the last 12 hours: none  # Nausea severity the last 12 hours: none  # Anxiety severity the last 12 hours: none               Review of Systems:     Besides above, an additional ROS was not possible as the patient was not able to answer questions          Medications:     I have reviewed this patient's medication profile and medications during this hospitalization.    Noted meds:  APAP 650 TID, Carbidopa-levodopa; prn hydromorphone (none), lorazepam (x3), quetiapine 12.5 (none),  baclofen 10 mg TID           Physical Exam:   Vitals were reviewed  Temp: 99.2  F (37.3  C) Temp src: Axillary BP: (!) 153/80 Pulse: 88   Resp: 18 SpO2: 96 % O2 Device: Nasal cannula Oxygen Delivery: 3 LPM  Patient lying in bed, eyes closed.  Does not open to tactile stimuli or daughter  Lungs:  No accessory mm use, normal effort  Abd: soft  Extrem:  No edema  Neuro:  Intermittently moving legs, R hand pill-rolling tremor emerges when stimulated  Skin: Warm, mildly diaphoretic             Data Reviewed:     Reviewed recent pertinent imaging, comments:   None new    Reviewed recent labs, comments:   None  new      20 minutes unit time spent in reviewing record, patient examination, discussion with daughter,  and documentation.  10 minutes spent with daughter, >50% spent in counseling re: symptom management and hospice planning.     Dali Dutta MD  Palliative Medicine  Pager [phone]

## 2020-11-24 NOTE — DISCHARGE SUMMARY
[facility], [place]   Cardiothor[facility] Hospital Discharge Summary     Андрей Lawson MRN# [id number]   Age: 79 year old YOB: 1940     Admitting Physician:  Josiah Smith MD  Discharge Physician:  JAIMIE Lawler  Primary Care Physician:        Josef Rodriguez     DATE OF ADMISSION: 11/18/2020      DATE OF DISCHARGE: November 24, 2020          Primary Diagnoses:   1. Type A aortic dissection   2. Hx Parkinson's with weakness  3. HTN   4. Discharge to [facility] ([facility])   5. DNR/DNI   6. Chronic leg and acute back pain   7. Chronic malnutrition           Secondary Diagnoses:   1. Osteoarthritis   2. Hx L knee replacement     PROCEDURES PERFORMED:  none    CULTURE RESULTS:  none    CONSULTS:    1.  PT/OT  2.  Palliative   3.  Social Work      BRIEF HISTORY OF ILLNESS:  Андрей Lawson is a 79 year old male with PMH of Parkinsons admitted from OSH with type A aortic dissection. On arrival patient was bradycardic in the 40s and irregular with SBP on the right arm in the 180s and SBPs on the left of 70's. Per EMS patient had been reciving Esmolol and labetalol for BP control. In discussion with patients daughter she confirmed that her father would not want heroic measures or to be intubated base on poor prognosis.   He was transferred to the telemedicine floor with plans to discharge to Home Hospice with his daughter.    Discharge medications and medication counseling provided for patient and daughter. She is aware of rising WBC and efforts for pain control.   On November 24, 2020, he was discharged to home on [facility] in stable condition.      Patient discharged on aspirin:  no  Patient discharged on beta blocker: no  Patient discharged on ACE Inhibitor/ARB: no             Discharge Disposition:     Discharged to home            Condition on Discharge:     Discharge condition: Stable   Discharge vitals: Blood pressure (!) 153/80, pulse  88, temperature 99.2  F (37.3  C), temperature source Axillary, resp. rate 18, weight 85.9 kg (189 lb 6 oz), SpO2 96 %.     Code status on discharge: Full Code     Vitals:    11/19/20 1728 11/21/20 1752   Weight: 97.9 kg (215 lb 13.3 oz) 85.9 kg (189 lb 6 oz)       DAY OF DISCHARGE PHYSICAL EXAM:  Gen:  NAD, attempting to talk but unintelligible speech   CV: RRR, S1S2 normal, no murmurs, rubs, or gallops  Pulm:  CTA, no rhonchi or wheezes  Abd:  Soft, nondistended, NTTP  Ext: no dependent edema    BMP  Recent Labs   Lab 11/24/20  0702 11/18/20  0533   * 139   POTASSIUM 3.7 4.4   CHLORIDE 120* 110*   JENNIFER 9.5 7.8*   CO2 20 22   BUN 62* 33*   CR 1.34* 1.17   * 128*     CBC  Recent Labs   Lab 11/24/20  0702 11/18/20  0936 11/18/20  0533   WBC 17.9*  --  13.1*   RBC 4.72  --  3.43*   HGB 15.0 11.2* 10.8*   HCT 47.9  --  34.9*   *  --  102*   MCH 31.8  --  31.5   MCHC 31.3*  --  30.9*   RDW 14.9  --  15.5*     --  128*     Recent Labs   Lab Test 11/24/20  0702   PROTTOTAL 8.2   ALBUMIN 3.3*   BILITOTAL 1.3   ALKPHOS 118   AST 34   ALT 14     ECHOCARDIOGRAM, 11/18/2020-   Technically difficult study.Extremely poor acoustic windows.  Limited information was obtained during study.   LVEF 50% based on biplane 2D tracing.  Right ventricular function, chamber size, wall motion, and thickness are normal.  Sinuses of Valsalva 4.3 cm.    Ascending aorta 5.4 cm.  Study image quality suboptimal to diagnose dissection.  Dilation of the inferior vena cava is present with abnormal respiratory variation in diameter.  Trivial pericardial effusion is present.  There is no prior study for direct comparison.    DISCHARGE INSTRUCTIONS:  You are discharging with a Referral for Home Hospice.   You will have an agency come to your house today for further instructions and assistance.     Bowel activity may be slow. If necessary, you may take an over the counter laxative such as Milk of Magnesia or Miralax. You may have  stool softeners prescribed (docusate sodium, Senokot). We recommend using stool softeners as needed.      DISCHARGE MEDICATIONS:    Андрей Lawson   Home Medication Instructions ENEIDA:[id number]    Printed on:11/24/20 0869   Medication Information                      acetaminophen (TYLENOL) 32 mg/mL liquid  Take 20.3 mLs (650 mg) by mouth 3 times daily             artificial saliva (BIOTENE DRY MOUTHWASH) LIQD liquid  Swish and spit 15 mLs in mouth 4 times daily Can apply with mouth swabs             baclofen (FIRST-BACLOFEN) 5 MG/ML SUSP  Take 2 mLs (10 mg) by mouth 3 times daily             carbidopa-levodopa (PARCOPA)  MG ODT  Take 2 tablets by mouth 3 times daily             docusate (COLACE) 50 MG/5ML liquid  Take 10 mLs (100 mg) by mouth 2 times daily             HYDROcodone-acetaminophen (NORCO) 5-325 MG per tablet  Take 1-2 tablets by mouth every 4 hours as needed for moderate to severe pain (Moderate to Severe Pain)             hypromellose (GENTEAL) 0.3 % SOLN ophthalmic solution  Apply 1 drop to eye             LORazepam (ATIVAN) 2 MG/ML (HIGH CONC) solution  Take 0.5 mLs (1 mg) by mouth every 6 hours as needed for agitation or anxiety               CC:Josef Kruger      [facility]   Cardiothoracic Surgery  Office phone: [phone]  Office fax: [phone]

## 2020-11-24 NOTE — PLAN OF CARE
[id number]: [facility]. Family at bedside and communicating intermittently with them (laughing and mumbling words but difficult to understand), appears comfortable, turned x2 but patient dislikes pillows underneath hips. Held HS meds except for sinemet and baclofen as patient having increased difficulty swallowing and following directions (risk of aspirating). Calm during this shift, no PRN Ativan needed. On 3L NC for comfort, breathing regular, no agonal or wheezing noted. Torres with output, no BM.   Discharge home with family for hospice care at 10:30am, ride set-up.  Will continue to monitor and follow POC.

## 2020-11-24 NOTE — PROGRESS NOTES
Care Management Discharge Note    Discharge Date: 11/24/20(Hospice)       Discharge Disposition: Home with [facility]  Intake appointment scheduled for 12:45pm at pt's home.     Discharge Services:  Hospice    Discharge DME:  NA- hospice agency to further assess at time of intake.     Discharge Transportation: Stretcher transport with oxygen arranged via [employer] [facility] ([phone]) for today at 10:30am.    Due to need for oxygen that [facility] was unaware of and time needed to deliver this, transport was rescheduled for today at 6pm. Emma was updated. Emma reported wanting a hospital bed for pt and a bedside table.   LEVI updated Krupa with [facility] as to transport time and all equipment needed/requested and she reported a nurse will be to pt's home at 7pm to complete intake.     Private pay costs discussed: See previous  documentation    PAS Confirmation Code:  NA  Patient/family educated on Medicare website which has current facility and service quality ratings:  See previous  documentation    Education Provided on the Discharge Plan:  Yes  Persons Notified of Discharge Plans: Pt's daughter Emma, bedside nurse, charge nurse, NST, [facility], medical team, discharge pharmacy liaison   Patient/Family in Agreement with the Plan:  Pt's daughter/family contact Emma is agreeable and has been in communication with her brother regarding the plan as well.     Handoff Referral Completed: Yes    Additional Information:  Pt has medications in the discharge pharmacy that need to be sent with pt- NST Meghan was informed of this.    Pt also needs an updated PCS form and Meghan was informed of this as well.    Per discharge pharmacy liaison Shagufta Rocha, there are two medications that cannot be billed/filled at the pharmacy. LEVI spoke with [facility] liaison Krupa and explained this and she asked that all meds that can be filled get filled at discharge pharmacy and  reported the hospice nurse will reconcile everything today and can order the other meds if needed. LEVI informed Shagufta of this. LEVI also informed pt's MD, Dr. Sanchez, of this and this plan was approved.     Discharge orders were sent via [facility] to [facility] at 8:50am.     LENY Amor, LICSW  7B   [phone] (pager) [phone]  11/24/2020

## 2020-11-24 NOTE — DISCHARGE INSTRUCTIONS
You are discharging with a Referral for Home Hospice.   You will have an agency come to your house today for further instructions and assistance.     Bowel activity may be slow. If necessary, you may take an over the counter laxative such as Milk of Magnesia or Miralax. You may have stool softeners prescribed (docusate sodium, Senokot). We recommend using stool softeners as needed.      REGARDING PRESCRIPTION REFILLS.  Please utilize Home Hospice services for all future refills.     POST-OPERATIVE CLINIC VISITS  You can folllow up with your PCP or Hospice service for all questions and concerns.     Thank you,    Your Cardiothoracic Surgery Team  Marika Hernandez RN Care Coordinator-  [phone]

## 2020-11-24 NOTE — PROGRESS NOTES
CVTS:       Oxygen Documentation:   I certify that this patient, Андрей Lawson has been under my care (or a nurse practitioner or physican's assistant working with me). This is the face-to-face encounter for oxygen medical necessity.      Андрей Lawson is now in a chronic stable state and continues to require supplemental oxygen. Patient has continued oxygen desaturation due to Emphysema J43.9.    Alternative treatment(s) tried or considered and deemed clinically infective for treatment of Emphysema J43.9 include nothing.  If portability is ordered, is the patient mobile within the home? no    **Patients who qualify for home O2 coverage under the CMS guidelines require ABG tests or O2 sat readings obtained closest to, but no earlier than 2 days prior to the discharge, as evidence of the need for home oxygen therapy. Testing must be performed while patient is in the chronic stable state. See notes for O2 sats.**      Jaxson Sanchez PA-C  Cardiothoracic Surgery  Pager [phone]    10:44 AM   November 24, 2020

## 2020-11-25 NOTE — PROGRESS NOTES
Prior Authorization Denial    LORazepam 2MG/ML concentrate  Date Initiated: 11/24/2020  Date Completed: 11/24/2020  Prior Auth Type: Non-Formulary                Status: Denied    Denial Date: 11/24/2020   Denial Reasoning: Ins prefers diazepam solution    Insurance: [employer] [place] - Phone [phone] Fax [phone]  ID: [id number]  Case Number: [id number]   Submitted Via: Julio Cesar Rocha  [facility] Liaison  Ph: [phone] Page: [phone]

## 2020-11-25 NOTE — DISCHARGE SUMMARY
EMS arrived to transport patient to home hospice aroun 1950. Pt seemed to be comfortably sleeping, non-verbal, on 3L O2. Torres catheter in place with blood-tinged output. Belongings sent with daughter.

## 2020-11-25 NOTE — PLAN OF CARE
Called transport to check estimated arrival time, ride delayed by another 45 minutes (should arrive around 1920). Called [facility], awaiting call back from triage nurse.

## 2020-11-25 NOTE — PLAN OF CARE
Pt appears comfortable in bed. Vital signs taken when daughter requests, VSS on 3L. Pt becoming less responsive throughout shift, ativan x1 for dyspnea as dilaudid PO unavailable on floor. Blood in urine noted in morning - MD paged, no interventions at this time. No BM. AVS gone over with daughter, signed copy in patient chart.

## 2020-11-30 ENCOUNTER — DOCUMENTATION ONLY (OUTPATIENT)
Dept: OTHER | Facility: CLINIC | Age: 80
End: 2020-11-30

## 2022-11-25 ENCOUNTER — LAB REQUISITION (OUTPATIENT)
Dept: LAB | Facility: CLINIC | Age: 82
End: 2022-11-25
Payer: COMMERCIAL

## 2022-11-25 DIAGNOSIS — D64.9 ANEMIA, UNSPECIFIED: ICD-10-CM

## 2024-03-29 NOTE — PLAN OF CARE
BP (!) 170/93 (BP Location: Left arm)   Pulse 87   Temp 99  F (37.2  C) (Axillary)   Resp 20   Wt 85.9 kg (189 lb 6 oz)   SpO2 94%   BMI 27.17 kg/m    Only done VS with family request    [facility]  [id number]  Reason for admission: Aorta aneurysm  Neuro: disoriented, pt drowsy most of the shift,   Cardiac: ex HTN- paged MD they are aware, RO chest pain  Respiratory: ex LS: coarse, 3L-O2, RO SOB  GI/: placed rodgers @ 1700 for end of life, suppository given x1- MD would like BM before discharge  Skin: bottom reddened- applied barrier cream  Pain: given ativan x1 for agitation, repositioned each time he looked uncomfortable first  Lines: no IV access- leaking and removed  Drains: Rodgers in place- nat output  Incisions: X  Activity: assist x2 for repositioning, pt stiff from parkinsons  Diet: Regular diet-not tolerating, swabs and sips only- most medications converted to solution  Labs: reviewed  Changes/Plan: plan to discharge tomorrow 10:30am to home hospice, continue POC     LMOM to patient to call back office regarding follow-up appt to discuss about IVC filter as per Dr. Ferraro. ----- Message from Miley Ferraro MD sent at 3/28/2024 11:40 PM EDT -----  Please call the patient to schedule visit to discuss IVC filter follow up.     Thank you

## (undated) DEVICE — GLOVE PROTEXIS W/NEU-THERA 7.5  2D73TE75

## (undated) DEVICE — DRAIN PENROSE 0.25"X18" LATEX FREE GR201

## (undated) DEVICE — SOL WATER IRRIG 1000ML BOTTLE 07139-09

## (undated) DEVICE — PREP SKIN SCRUB TRAY 4461A

## (undated) DEVICE — PACK MINOR SBA15MIFSE

## (undated) DEVICE — SU ETHILON 3-0 PS-2 18" 1669H

## (undated) DEVICE — SUPPORTER ATHLETIC LG CLINITEX LF 67-1006

## (undated) DEVICE — SU MONOCRYL 4-0 PS-2 18" UND Y496G

## (undated) DEVICE — DRAPE LAP W/ARMBOARD 29410

## (undated) DEVICE — DRAIN JACKSON PRATT CHANNEL 15FR ROUND HUBLESS SIL JP-2228

## (undated) DEVICE — DRAIN JACKSON PRATT RESERVOIR 100ML SU130-1305

## (undated) DEVICE — SUPPORTER ATHLETIC XL CLINITEX LF 67-1007

## (undated) DEVICE — DRSG KERLIX FLUFFS X5

## (undated) DEVICE — SUCTION TIP YANKAUER W/O VENT K86

## (undated) DEVICE — NDL 19GA 1.5"